# Patient Record
Sex: FEMALE | Race: WHITE | HISPANIC OR LATINO
[De-identification: names, ages, dates, MRNs, and addresses within clinical notes are randomized per-mention and may not be internally consistent; named-entity substitution may affect disease eponyms.]

---

## 2020-07-27 ENCOUNTER — APPOINTMENT (OUTPATIENT)
Dept: PULMONOLOGY | Facility: CLINIC | Age: 75
End: 2020-07-27

## 2024-01-22 ENCOUNTER — INPATIENT (INPATIENT)
Facility: HOSPITAL | Age: 79
LOS: 2 days | Discharge: HOME | DRG: 193 | End: 2024-01-25
Attending: STUDENT IN AN ORGANIZED HEALTH CARE EDUCATION/TRAINING PROGRAM | Admitting: HOSPITALIST
Payer: MEDICARE

## 2024-01-22 VITALS
RESPIRATION RATE: 22 BRPM | SYSTOLIC BLOOD PRESSURE: 161 MMHG | HEART RATE: 120 BPM | WEIGHT: 168.21 LBS | TEMPERATURE: 101 F | DIASTOLIC BLOOD PRESSURE: 84 MMHG | OXYGEN SATURATION: 96 %

## 2024-01-22 LAB
ALBUMIN SERPL ELPH-MCNC: 3.8 G/DL — SIGNIFICANT CHANGE UP (ref 3.3–5.2)
ALP SERPL-CCNC: 84 U/L — SIGNIFICANT CHANGE UP (ref 40–120)
ALT FLD-CCNC: 12 U/L — SIGNIFICANT CHANGE UP
ANION GAP SERPL CALC-SCNC: 12 MMOL/L — SIGNIFICANT CHANGE UP (ref 5–17)
APPEARANCE UR: CLEAR — SIGNIFICANT CHANGE UP
AST SERPL-CCNC: 22 U/L — SIGNIFICANT CHANGE UP
BACTERIA # UR AUTO: NEGATIVE /HPF — SIGNIFICANT CHANGE UP
BASOPHILS # BLD AUTO: 0.01 K/UL — SIGNIFICANT CHANGE UP (ref 0–0.2)
BASOPHILS NFR BLD AUTO: 0.2 % — SIGNIFICANT CHANGE UP (ref 0–2)
BILIRUB SERPL-MCNC: 0.4 MG/DL — SIGNIFICANT CHANGE UP (ref 0.4–2)
BILIRUB UR-MCNC: NEGATIVE — SIGNIFICANT CHANGE UP
BUN SERPL-MCNC: 10.7 MG/DL — SIGNIFICANT CHANGE UP (ref 8–20)
CALCIUM SERPL-MCNC: 9 MG/DL — SIGNIFICANT CHANGE UP (ref 8.4–10.5)
CAST: 1 /LPF — SIGNIFICANT CHANGE UP (ref 0–4)
CHLORIDE SERPL-SCNC: 96 MMOL/L — SIGNIFICANT CHANGE UP (ref 96–108)
CO2 SERPL-SCNC: 25 MMOL/L — SIGNIFICANT CHANGE UP (ref 22–29)
COLOR SPEC: SIGNIFICANT CHANGE UP
CREAT SERPL-MCNC: 0.71 MG/DL — SIGNIFICANT CHANGE UP (ref 0.5–1.3)
DIFF PNL FLD: NEGATIVE — SIGNIFICANT CHANGE UP
EGFR: 88 ML/MIN/1.73M2 — SIGNIFICANT CHANGE UP
EOSINOPHIL # BLD AUTO: 0 K/UL — SIGNIFICANT CHANGE UP (ref 0–0.5)
EOSINOPHIL NFR BLD AUTO: 0 % — SIGNIFICANT CHANGE UP (ref 0–6)
GLUCOSE SERPL-MCNC: 105 MG/DL — HIGH (ref 70–99)
GLUCOSE UR QL: NEGATIVE MG/DL — SIGNIFICANT CHANGE UP
HCT VFR BLD CALC: 43 % — SIGNIFICANT CHANGE UP (ref 34.5–45)
HGB BLD-MCNC: 14.3 G/DL — SIGNIFICANT CHANGE UP (ref 11.5–15.5)
HMPV RNA SPEC QL NAA+PROBE: DETECTED
IMM GRANULOCYTES NFR BLD AUTO: 0.2 % — SIGNIFICANT CHANGE UP (ref 0–0.9)
KETONES UR-MCNC: 15 MG/DL
LEUKOCYTE ESTERASE UR-ACNC: NEGATIVE — SIGNIFICANT CHANGE UP
LYMPHOCYTES # BLD AUTO: 1 K/UL — SIGNIFICANT CHANGE UP (ref 1–3.3)
LYMPHOCYTES # BLD AUTO: 20 % — SIGNIFICANT CHANGE UP (ref 13–44)
MCHC RBC-ENTMCNC: 27.8 PG — SIGNIFICANT CHANGE UP (ref 27–34)
MCHC RBC-ENTMCNC: 33.3 GM/DL — SIGNIFICANT CHANGE UP (ref 32–36)
MCV RBC AUTO: 83.7 FL — SIGNIFICANT CHANGE UP (ref 80–100)
MONOCYTES # BLD AUTO: 0.21 K/UL — SIGNIFICANT CHANGE UP (ref 0–0.9)
MONOCYTES NFR BLD AUTO: 4.2 % — SIGNIFICANT CHANGE UP (ref 2–14)
NEUTROPHILS # BLD AUTO: 3.78 K/UL — SIGNIFICANT CHANGE UP (ref 1.8–7.4)
NEUTROPHILS NFR BLD AUTO: 75.4 % — SIGNIFICANT CHANGE UP (ref 43–77)
NITRITE UR-MCNC: NEGATIVE — SIGNIFICANT CHANGE UP
PH UR: 6 — SIGNIFICANT CHANGE UP (ref 5–8)
PLATELET # BLD AUTO: 196 K/UL — SIGNIFICANT CHANGE UP (ref 150–400)
POTASSIUM SERPL-MCNC: 4.5 MMOL/L — SIGNIFICANT CHANGE UP (ref 3.5–5.3)
POTASSIUM SERPL-SCNC: 4.5 MMOL/L — SIGNIFICANT CHANGE UP (ref 3.5–5.3)
PROCALCITONIN SERPL-MCNC: 0.1 NG/ML — SIGNIFICANT CHANGE UP (ref 0.02–0.1)
PROT SERPL-MCNC: 6.9 G/DL — SIGNIFICANT CHANGE UP (ref 6.6–8.7)
PROT UR-MCNC: NEGATIVE MG/DL — SIGNIFICANT CHANGE UP
RAPID RVP RESULT: DETECTED
RBC # BLD: 5.14 M/UL — SIGNIFICANT CHANGE UP (ref 3.8–5.2)
RBC # FLD: 14.8 % — HIGH (ref 10.3–14.5)
RBC CASTS # UR COMP ASSIST: 6 /HPF — HIGH (ref 0–4)
SARS-COV-2 RNA SPEC QL NAA+PROBE: SIGNIFICANT CHANGE UP
SODIUM SERPL-SCNC: 133 MMOL/L — LOW (ref 135–145)
SP GR SPEC: 1.03 — SIGNIFICANT CHANGE UP (ref 1–1.03)
SQUAMOUS # UR AUTO: 2 /HPF — SIGNIFICANT CHANGE UP (ref 0–5)
TROPONIN T, HIGH SENSITIVITY RESULT: 11 NG/L — SIGNIFICANT CHANGE UP (ref 0–51)
UROBILINOGEN FLD QL: 1 MG/DL — SIGNIFICANT CHANGE UP (ref 0.2–1)
WBC # BLD: 5.01 K/UL — SIGNIFICANT CHANGE UP (ref 3.8–10.5)
WBC # FLD AUTO: 5.01 K/UL — SIGNIFICANT CHANGE UP (ref 3.8–10.5)
WBC UR QL: 1 /HPF — SIGNIFICANT CHANGE UP (ref 0–5)

## 2024-01-22 PROCEDURE — 71275 CT ANGIOGRAPHY CHEST: CPT | Mod: 26,MA

## 2024-01-22 PROCEDURE — 99285 EMERGENCY DEPT VISIT HI MDM: CPT

## 2024-01-22 PROCEDURE — 71045 X-RAY EXAM CHEST 1 VIEW: CPT | Mod: 26

## 2024-01-22 RX ORDER — IPRATROPIUM/ALBUTEROL SULFATE 18-103MCG
3 AEROSOL WITH ADAPTER (GRAM) INHALATION ONCE
Refills: 0 | Status: COMPLETED | OUTPATIENT
Start: 2024-01-22 | End: 2024-01-22

## 2024-01-22 RX ORDER — ACETAMINOPHEN 500 MG
975 TABLET ORAL ONCE
Refills: 0 | Status: COMPLETED | OUTPATIENT
Start: 2024-01-22 | End: 2024-01-22

## 2024-01-22 RX ORDER — ACETAMINOPHEN 500 MG
650 TABLET ORAL ONCE
Refills: 0 | Status: COMPLETED | OUTPATIENT
Start: 2024-01-22 | End: 2024-01-22

## 2024-01-22 RX ORDER — AZITHROMYCIN 500 MG/1
500 TABLET, FILM COATED ORAL ONCE
Refills: 0 | Status: COMPLETED | OUTPATIENT
Start: 2024-01-22 | End: 2024-01-22

## 2024-01-22 RX ORDER — SODIUM CHLORIDE 9 MG/ML
1750 INJECTION INTRAMUSCULAR; INTRAVENOUS; SUBCUTANEOUS ONCE
Refills: 0 | Status: COMPLETED | OUTPATIENT
Start: 2024-01-22 | End: 2024-01-22

## 2024-01-22 RX ORDER — CEFTRIAXONE 500 MG/1
1000 INJECTION, POWDER, FOR SOLUTION INTRAMUSCULAR; INTRAVENOUS ONCE
Refills: 0 | Status: COMPLETED | OUTPATIENT
Start: 2024-01-22 | End: 2024-01-22

## 2024-01-22 RX ORDER — CEFTRIAXONE 500 MG/1
1000 INJECTION, POWDER, FOR SOLUTION INTRAMUSCULAR; INTRAVENOUS ONCE
Refills: 0 | Status: DISCONTINUED | OUTPATIENT
Start: 2024-01-22 | End: 2024-01-22

## 2024-01-22 RX ADMIN — Medication 975 MILLIGRAM(S): at 22:00

## 2024-01-22 RX ADMIN — Medication 975 MILLIGRAM(S): at 21:46

## 2024-01-22 RX ADMIN — Medication 3 MILLILITER(S): at 18:43

## 2024-01-22 RX ADMIN — SODIUM CHLORIDE 875 MILLILITER(S): 9 INJECTION INTRAMUSCULAR; INTRAVENOUS; SUBCUTANEOUS at 16:50

## 2024-01-22 RX ADMIN — CEFTRIAXONE 1000 MILLIGRAM(S): 500 INJECTION, POWDER, FOR SOLUTION INTRAMUSCULAR; INTRAVENOUS at 16:50

## 2024-01-22 RX ADMIN — Medication 3 MILLILITER(S): at 19:30

## 2024-01-22 RX ADMIN — AZITHROMYCIN 255 MILLIGRAM(S): 500 TABLET, FILM COATED ORAL at 16:50

## 2024-01-22 RX ADMIN — Medication 650 MILLIGRAM(S): at 16:08

## 2024-01-22 NOTE — ED ADULT NURSE REASSESSMENT NOTE - NS ED NURSE REASSESS COMMENT FT1
Patient remains resting in bed. Pt denies new CO pain or discomfort @ this time. Pt remains on monitor. IV site assessed - dry intact transparent, flushing well, good blood return, no abnormalities noted. Pt awaiting dispo.

## 2024-01-22 NOTE — ED PROVIDER NOTE - DATE/TIME 1
Render Note In Bullet Format When Appropriate: No Duration Of Freeze Thaw-Cycle (Seconds): 10 Consent: The patient's consent was obtained including but not limited to risks of crusting, scabbing, blistering, scarring, darker or lighter pigmentary change, recurrence, incomplete removal and infection. Detail Level: Detailed Number Of Freeze-Thaw Cycles: 1 freeze-thaw cycle Show Applicator Variable?: Yes Post-Care Instructions: I reviewed with the patient in detail post-care instructions. Patient is to wear sunprotection, and avoid picking at any of the treated lesions. Pt may apply Vaseline to crusted or scabbing areas. 22-Jan-2024 19:05

## 2024-01-22 NOTE — ED PROVIDER NOTE - CLINICAL SUMMARY MEDICAL DECISION MAKING FREE TEXT BOX
79 yo F with pmh htn, hld, remote TIA presents with 103 deg fever, sob, cough. Pt meets sirs criteria, with respiratory rate, temp, heart rate. suspect pneumonia.    - iv azithromycin and ceftriaxone  - lactate and white count normal  - iv fluids 1.75 L bolus  - pending rvp and ua  - no focal consolidation on cxr  - s/p tylenol for fever 77 yo F with pmh htn, hld, remote TIA presents with 103 deg fever, sob, cough. Pt meets sirs criteria, with respiratory rate, temp, heart rate. suspect pneumonia.    - iv azithromycin and ceftriaxone  - lactate and white count normal  - iv fluids 1.75 L bolus  - pending rvp and ua  - no focal consolidation on cxr  - s/p tylenol for fever    Estinfa: Patient re-assessed after CT scan. She is resting comfortably but pulse ox noted to be 90-92 %

## 2024-01-22 NOTE — ED ADULT TRIAGE NOTE - CHIEF COMPLAINT QUOTE
high fever and cough, sent in from urgent care. high fever and cough, sent in from urgent care. hard of hearing.

## 2024-01-22 NOTE — ED ADULT NURSE REASSESSMENT NOTE - NS ED NURSE REASSESS COMMENT FT1
Patient received from RN @ shift change. Pt remains resting in bed. Son @ BS. Pt remains on monitor. IV site assessed - dry intact transparent, flushing well, good blood return, no abnormalities noted. Pt [place]. Visualized ambulation 1 assist to bathroom with son. A&Ox4, HEENT clear, pt receiving neb tx, pt states she does not wear O2 @ home, denies CP, abd SNT. Pt awaiting CTA.

## 2024-01-22 NOTE — ED ADULT NURSE NOTE - NSFALLUNIVINTERV_ED_ALL_ED
Bed/Stretcher in lowest position, wheels locked, appropriate side rails in place/Call bell, personal items and telephone in reach/Instruct patient to call for assistance before getting out of bed/chair/stretcher/Non-slip footwear applied when patient is off stretcher/[place] to call system/Physically safe environment - no spills, clutter or unnecessary equipment/Purposeful proactive rounding/Room/bathroom lighting operational, light cord in reach

## 2024-01-22 NOTE — ED PROVIDER NOTE - NS ED ROS FT
Const: + fever, chills  HEENT: Denies blurry vision, sore throat  Neck: Denies neck pain/stiffness  Resp: +coughing, SOB  Cardiovascular: + CP, Denies palpitations, LE edema  GI: + abdominal painDenies nausea, vomiting, diarrhea, constipation  : Denies urinary frequenc, dysuria  Neuro: Denies HA, dizziness, numbness, weakness  Skin: Denies rashes.

## 2024-01-22 NOTE — ED PROVIDER NOTE - ATTENDING CONTRIBUTION TO CARE
78 yoF; with PMH significant for HTN, HLD, TIA A-fib (on Plavix); now presenting with fever, chills, cough, generalized malaise.  Patient reports increasing cough and shortness of breath over the past 5 days.  Complaining of increasing productive cough with yellow sputum.  Complaining of mild chest tightness.  Complaining of generalized malaise with bodyaches over the past 2 days.  Denies any sick contacts or travel.  Denies dysuria, frequency, urgency.  Denies any diarrhea.  Denies any abdominal pain.  Denies nausea or vomiting.  Denies neck pain or headache.  General:     NAD  Head:     NC/AT, EOMI, oral mucosa moist  Neck:     trachea midline  Lungs:     CTA b/l, no w/r/r  CVS:     S1S2, RRR, no m/g/r  Abd:     +BS, s/nt/nd, no organomegaly  Ext:    2+ radial and pedal pulses, no c/c/e  Neuro: AAOx3, no sensory/motor deficits  A/P:  78yoF p/w cough, fever, bodyaches.  patient febrile, tachycardic, and borderline hypoxic (93%). will do labs, xray, nebs, abx, ivf, cta r/o pe.

## 2024-01-22 NOTE — ED PROVIDER NOTE - PHYSICAL EXAMINATION
Const: Awake, alert and oriented. In no acute distress but appear fatigue and uncomfortable  HEENT: NC/AT. dry mucous membranes.  Eyes: No scleral icterus. EOMI.  Neck:. Soft and supple. Full ROM without pain.  Cardiac: Regular rate and regular rhythm. +S1/S2. No murmurs. Peripheral pulses 2+ and symmetric. No LE edema.  Resp: Speaking in full sentences. No evidence of respiratory distress. No wheezes, rales or rhonchi.  Abd: Soft, non-tender, non-distended. Normal bowel sounds in all 4 quadrants. No guarding or rebound.  Back: Spine midline and non-tender. No CVAT.  Skin: No rashes, abrasions or lacerations.  Lymph: No cervical lymphadenopathy.  Neuro: Awake, alert & oriented x 3. Moves all extremities symmetrically.

## 2024-01-22 NOTE — ED PROVIDER NOTE - PROGRESS NOTE DETAILS
Dyspnea symptomatically improved on nebulizer. Current temp 99.9 deg F post tylenol po. Continues to be tachycardic to 110s, spo2 94% post fluid bolus and antibiotics. lung exam stable  - pending CTA chest, rule out pe  - human metapneumonvirus positive on rvp

## 2024-01-22 NOTE — ED ADULT TRIAGE NOTE - BP NONINVASIVE DIASTOLIC (MM HG)
84 [Alert] : alert [No Acute Distress] : no acute distress [Normocephalic] : normocephalic [EOMI Bilateral] : EOMI bilateral [Clear tympanic membranes with bony landmarks and light reflex present bilaterally] : clear tympanic membranes with bony landmarks and light reflex present bilaterally  [Pink Nasal Mucosa] : pink nasal mucosa [Nonerythematous Oropharynx] : nonerythematous oropharynx [Supple, full passive range of motion] : supple, full passive range of motion [No Palpable Masses] : no palpable masses [Clear to Auscultation Bilaterally] : clear to auscultation bilaterally [Regular Rate and Rhythm] : regular rate and rhythm [Normal S1, S2 audible] : normal S1, S2 audible [No Murmurs] : no murmurs [+2 Femoral Pulses] : +2 femoral pulses [Soft] : soft [NonTender] : non tender [Non Distended] : non distended [Normoactive Bowel Sounds] : normoactive bowel sounds [No Hepatomegaly] : no hepatomegaly [No Splenomegaly] : no splenomegaly [Mark: _____] : Mark [unfilled] [No Abnormal Lymph Nodes Palpated] : no abnormal lymph nodes palpated [Normal Muscle Tone] : normal muscle tone [No Gait Asymmetry] : no gait asymmetry [No pain or deformities with palpation of bone, muscles, joints] : no pain or deformities with palpation of bone, muscles, joints [Straight] : straight [+2 Patella DTR] : +2 patella DTR [Cranial Nerves Grossly Intact] : cranial nerves grossly intact [No Rash or Lesions] : no rash or lesions

## 2024-01-23 DIAGNOSIS — J12.3 HUMAN METAPNEUMOVIRUS PNEUMONIA: ICD-10-CM

## 2024-01-23 LAB
ALBUMIN SERPL ELPH-MCNC: 3.6 G/DL — SIGNIFICANT CHANGE UP (ref 3.3–5.2)
ALP SERPL-CCNC: 74 U/L — SIGNIFICANT CHANGE UP (ref 40–120)
ALT FLD-CCNC: 11 U/L — SIGNIFICANT CHANGE UP
ANION GAP SERPL CALC-SCNC: 9 MMOL/L — SIGNIFICANT CHANGE UP (ref 5–17)
AST SERPL-CCNC: 18 U/L — SIGNIFICANT CHANGE UP
BASOPHILS # BLD AUTO: 0.01 K/UL — SIGNIFICANT CHANGE UP (ref 0–0.2)
BASOPHILS NFR BLD AUTO: 0.2 % — SIGNIFICANT CHANGE UP (ref 0–2)
BILIRUB SERPL-MCNC: 0.3 MG/DL — LOW (ref 0.4–2)
BUN SERPL-MCNC: 8.2 MG/DL — SIGNIFICANT CHANGE UP (ref 8–20)
CALCIUM SERPL-MCNC: 8.3 MG/DL — LOW (ref 8.4–10.5)
CHLORIDE SERPL-SCNC: 103 MMOL/L — SIGNIFICANT CHANGE UP (ref 96–108)
CO2 SERPL-SCNC: 26 MMOL/L — SIGNIFICANT CHANGE UP (ref 22–29)
CREAT SERPL-MCNC: 0.68 MG/DL — SIGNIFICANT CHANGE UP (ref 0.5–1.3)
CULTURE RESULTS: SIGNIFICANT CHANGE UP
EGFR: 90 ML/MIN/1.73M2 — SIGNIFICANT CHANGE UP
EOSINOPHIL # BLD AUTO: 0.02 K/UL — SIGNIFICANT CHANGE UP (ref 0–0.5)
EOSINOPHIL NFR BLD AUTO: 0.4 % — SIGNIFICANT CHANGE UP (ref 0–6)
GLUCOSE BLDC GLUCOMTR-MCNC: 117 MG/DL — HIGH (ref 70–99)
GLUCOSE BLDC GLUCOMTR-MCNC: 125 MG/DL — HIGH (ref 70–99)
GLUCOSE SERPL-MCNC: 106 MG/DL — HIGH (ref 70–99)
HCT VFR BLD CALC: 41.2 % — SIGNIFICANT CHANGE UP (ref 34.5–45)
HGB BLD-MCNC: 13.6 G/DL — SIGNIFICANT CHANGE UP (ref 11.5–15.5)
IMM GRANULOCYTES NFR BLD AUTO: 0.4 % — SIGNIFICANT CHANGE UP (ref 0–0.9)
LYMPHOCYTES # BLD AUTO: 1.16 K/UL — SIGNIFICANT CHANGE UP (ref 1–3.3)
LYMPHOCYTES # BLD AUTO: 22.5 % — SIGNIFICANT CHANGE UP (ref 13–44)
MCHC RBC-ENTMCNC: 28 PG — SIGNIFICANT CHANGE UP (ref 27–34)
MCHC RBC-ENTMCNC: 33 GM/DL — SIGNIFICANT CHANGE UP (ref 32–36)
MCV RBC AUTO: 84.9 FL — SIGNIFICANT CHANGE UP (ref 80–100)
MONOCYTES # BLD AUTO: 0.33 K/UL — SIGNIFICANT CHANGE UP (ref 0–0.9)
MONOCYTES NFR BLD AUTO: 6.4 % — SIGNIFICANT CHANGE UP (ref 2–14)
MRSA PCR RESULT.: SIGNIFICANT CHANGE UP
NEUTROPHILS # BLD AUTO: 3.61 K/UL — SIGNIFICANT CHANGE UP (ref 1.8–7.4)
NEUTROPHILS NFR BLD AUTO: 70.1 % — SIGNIFICANT CHANGE UP (ref 43–77)
PLATELET # BLD AUTO: 169 K/UL — SIGNIFICANT CHANGE UP (ref 150–400)
POTASSIUM SERPL-MCNC: 4.2 MMOL/L — SIGNIFICANT CHANGE UP (ref 3.5–5.3)
POTASSIUM SERPL-SCNC: 4.2 MMOL/L — SIGNIFICANT CHANGE UP (ref 3.5–5.3)
PROT SERPL-MCNC: 6.2 G/DL — LOW (ref 6.6–8.7)
RBC # BLD: 4.85 M/UL — SIGNIFICANT CHANGE UP (ref 3.8–5.2)
RBC # FLD: 15 % — HIGH (ref 10.3–14.5)
S AUREUS DNA NOSE QL NAA+PROBE: SIGNIFICANT CHANGE UP
SODIUM SERPL-SCNC: 138 MMOL/L — SIGNIFICANT CHANGE UP (ref 135–145)
SPECIMEN SOURCE: SIGNIFICANT CHANGE UP
WBC # BLD: 5.15 K/UL — SIGNIFICANT CHANGE UP (ref 3.8–10.5)
WBC # FLD AUTO: 5.15 K/UL — SIGNIFICANT CHANGE UP (ref 3.8–10.5)

## 2024-01-23 PROCEDURE — 99223 1ST HOSP IP/OBS HIGH 75: CPT

## 2024-01-23 PROCEDURE — 93306 TTE W/DOPPLER COMPLETE: CPT | Mod: 26

## 2024-01-23 RX ORDER — SODIUM CHLORIDE 9 MG/ML
1000 INJECTION, SOLUTION INTRAVENOUS
Refills: 0 | Status: DISCONTINUED | OUTPATIENT
Start: 2024-01-23 | End: 2024-01-25

## 2024-01-23 RX ORDER — INSULIN LISPRO 100/ML
VIAL (ML) SUBCUTANEOUS
Refills: 0 | Status: DISCONTINUED | OUTPATIENT
Start: 2024-01-23 | End: 2024-01-25

## 2024-01-23 RX ORDER — CEFTRIAXONE 500 MG/1
1000 INJECTION, POWDER, FOR SOLUTION INTRAMUSCULAR; INTRAVENOUS EVERY 24 HOURS
Refills: 0 | Status: DISCONTINUED | OUTPATIENT
Start: 2024-01-23 | End: 2024-01-25

## 2024-01-23 RX ORDER — ASPIRIN/CALCIUM CARB/MAGNESIUM 324 MG
1 TABLET ORAL
Refills: 0 | DISCHARGE

## 2024-01-23 RX ORDER — LANOLIN ALCOHOL/MO/W.PET/CERES
3 CREAM (GRAM) TOPICAL AT BEDTIME
Refills: 0 | Status: DISCONTINUED | OUTPATIENT
Start: 2024-01-23 | End: 2024-01-25

## 2024-01-23 RX ORDER — DEXTROSE 50 % IN WATER 50 %
25 SYRINGE (ML) INTRAVENOUS ONCE
Refills: 0 | Status: DISCONTINUED | OUTPATIENT
Start: 2024-01-23 | End: 2024-01-25

## 2024-01-23 RX ORDER — ENOXAPARIN SODIUM 100 MG/ML
40 INJECTION SUBCUTANEOUS EVERY 24 HOURS
Refills: 0 | Status: DISCONTINUED | OUTPATIENT
Start: 2024-01-23 | End: 2024-01-25

## 2024-01-23 RX ORDER — ACETAMINOPHEN 500 MG
650 TABLET ORAL EVERY 6 HOURS
Refills: 0 | Status: DISCONTINUED | OUTPATIENT
Start: 2024-01-23 | End: 2024-01-25

## 2024-01-23 RX ORDER — GLUCAGON INJECTION, SOLUTION 0.5 MG/.1ML
1 INJECTION, SOLUTION SUBCUTANEOUS ONCE
Refills: 0 | Status: DISCONTINUED | OUTPATIENT
Start: 2024-01-23 | End: 2024-01-25

## 2024-01-23 RX ORDER — DEXTROSE 50 % IN WATER 50 %
12.5 SYRINGE (ML) INTRAVENOUS ONCE
Refills: 0 | Status: DISCONTINUED | OUTPATIENT
Start: 2024-01-23 | End: 2024-01-25

## 2024-01-23 RX ORDER — IPRATROPIUM/ALBUTEROL SULFATE 18-103MCG
3 AEROSOL WITH ADAPTER (GRAM) INHALATION EVERY 6 HOURS
Refills: 0 | Status: DISCONTINUED | OUTPATIENT
Start: 2024-01-23 | End: 2024-01-25

## 2024-01-23 RX ORDER — DEXTROSE 50 % IN WATER 50 %
15 SYRINGE (ML) INTRAVENOUS ONCE
Refills: 0 | Status: DISCONTINUED | OUTPATIENT
Start: 2024-01-23 | End: 2024-01-25

## 2024-01-23 RX ORDER — ONDANSETRON 8 MG/1
4 TABLET, FILM COATED ORAL EVERY 8 HOURS
Refills: 0 | Status: DISCONTINUED | OUTPATIENT
Start: 2024-01-23 | End: 2024-01-25

## 2024-01-23 RX ORDER — INSULIN LISPRO 100/ML
VIAL (ML) SUBCUTANEOUS AT BEDTIME
Refills: 0 | Status: DISCONTINUED | OUTPATIENT
Start: 2024-01-23 | End: 2024-01-25

## 2024-01-23 RX ORDER — CLOPIDOGREL BISULFATE 75 MG/1
1 TABLET, FILM COATED ORAL
Refills: 0 | DISCHARGE

## 2024-01-23 RX ORDER — AZITHROMYCIN 500 MG/1
500 TABLET, FILM COATED ORAL EVERY 24 HOURS
Refills: 0 | Status: DISCONTINUED | OUTPATIENT
Start: 2024-01-23 | End: 2024-01-25

## 2024-01-23 RX ORDER — ROSUVASTATIN CALCIUM 5 MG/1
1 TABLET ORAL
Refills: 0 | DISCHARGE

## 2024-01-23 RX ORDER — VERAPAMIL HCL 240 MG
1 CAPSULE, EXTENDED RELEASE PELLETS 24 HR ORAL
Refills: 0 | DISCHARGE

## 2024-01-23 RX ADMIN — CEFTRIAXONE 1000 MILLIGRAM(S): 500 INJECTION, POWDER, FOR SOLUTION INTRAMUSCULAR; INTRAVENOUS at 17:08

## 2024-01-23 RX ADMIN — Medication 650 MILLIGRAM(S): at 21:55

## 2024-01-23 RX ADMIN — Medication 40 MILLIGRAM(S): at 21:55

## 2024-01-23 RX ADMIN — Medication 3 MILLILITER(S): at 21:26

## 2024-01-23 RX ADMIN — Medication 3 MILLILITER(S): at 14:47

## 2024-01-23 RX ADMIN — Medication 650 MILLIGRAM(S): at 22:54

## 2024-01-23 RX ADMIN — Medication 650 MILLIGRAM(S): at 08:43

## 2024-01-23 RX ADMIN — Medication 650 MILLIGRAM(S): at 09:40

## 2024-01-23 RX ADMIN — AZITHROMYCIN 255 MILLIGRAM(S): 500 TABLET, FILM COATED ORAL at 17:08

## 2024-01-23 RX ADMIN — Medication 3 MILLILITER(S): at 10:20

## 2024-01-23 NOTE — H&P ADULT - HISTORY OF PRESENT ILLNESS
78M with PMHX TIA, HTN, HLD presents to [facility] ER c/o Cough, SOB, and Fever Tmax 103F which began few days ago. Patient noted to have RLL PNA on CT imaging. RVP +HMPV. Given empiric IV ABX with Ceftriaxone/Azithromycin. Pt seen/examined. Poor historian. Answering questions appropriately but states she doesnt know her past medical problems, surgeries, or home medications. Asking if I can call her  in AM for more information but not now because he is sleeping. Denies any allergies.     ROS negative unless mentioned.    PMHX: TIA, HTN, HLD  PSHX: Unable to confirm  FamHx: Unable to confirm  Social Hx: Denies etoh/tobacco/drug abuse

## 2024-01-23 NOTE — H&P ADULT - ASSESSMENT
ASSESSMENT:  78M with PMHX TIA, HTN, HLD presents to [facility] ER c/o Cough, SOB, and Fever Tmax 103F which began few days ago admitted for Acute Hypoxic Respiratory Failure 2/2 RLL PNA and HMPV.    PLAN:  Acute Hypoxic Respiratory Failure 2/2 RLL PNA and HMPV  -Admit to   -O2 via NC PRN SPO2 >92%  -Duoneb q6  -BCX x2 Pending  -Check Sputum CX/Atypicals/MRSA  -WBC no leukocytosis  -Procal Negative  -CT Chest IVC +RLL PNA  -Empiric IV ABX with Ceftriaxone/Azithromycin  -RVP +HMPV  -Isolation Precautions    TIA, HTN, HLD  -Confirm ASA 81mg and Plavix 75mg q24 in AM  -Confirm Verapamil ER 100mg q24 in AM  -COnfirm Crestor 20mg in AM    Medication Reconciliation  -Patient does not know her pmhx/pshx/medications   -Pharmacy records reviewed and placed in med rec  -Patient asking to confirm meds with  in AM because he is sleeping

## 2024-01-23 NOTE — ED ADULT NURSE REASSESSMENT NOTE - NS ED NURSE REASSESS COMMENT FT1
Pt remains resting in bed. Pt remains on monitor. IV sites assessed - dry intact transparent, flushing well, good blood return, no abnormalities noted. No additional CO pain or discomfort @ this time. Safety maintained.

## 2024-01-23 NOTE — CONSULT NOTE ADULT - REASON FOR ADMISSION
Patient Instructions      1. Continue to monitor carbohydrate and protein intake- remember to keep your           total  carbohydrates to 50 grams or less per day for best results. 2. Remember hydration goals - usually 48 to 64 ounces of liquids per day  3. Continue to work towards exercise goals - minimum 3 days per week of 45          minutes to  1 hour at a time. 4. Remember to take vitamins as directed        Supplement Resource Guide    Importance of Protein:   Maintains lean body mass, produces antibodies to fight off infections, heals wounds, minimizes hair loss, helps to give you energy, helps with satiety, and keeping you full between meals. Importance of Calcium:  Needed for healthy bones and teeth, normal blood clotting, and nervous system functioning, higher risk of osteoporosis and bone disease with non-compliance. Importance of Multivitamins: Many functions. Supply you with extra nutrients that you may be missing from food. May lead to iron deficiency anemia, weakness, fatigue, and many other symptoms with non-compliance. Importance of B Vitamins:  Important for red blood cell formation, metabolism, energy, and helps to maintain a healthy nervous system. Protein Supplement  Find one you like now. Use immediately after surgery. Look for:  35-50g protein each day from your protein supplement once you reach the progression diet. 0-3 g fat per serving  0-3 g sugar per serving    Protein drinks should be split in separate dosages. Recommend: Lifelong  1 year + Calcium Supplement:     Start taking within a month after surgery. Look for: Calcium Citrate Plus D (1500 mg per day)  Recommend: Citracal     .          Avoid chocolate chewable calcium. Can use chewable bariatric or [employer] brand or similar chewable. The body cannot absorb more than 500-600 mg @ a time.       Take for Life Multi-vitamin Supplement:      1st Month SOB/Cough

## 2024-01-23 NOTE — CONSULT NOTE ADULT - SUBJECTIVE AND OBJECTIVE BOX
PULMONARY CONSULT NOTE      ALEJANDRO SANTILLAN-[id number]    Patient is a 78y old  Female who presents with a chief complaint of SOB/Cough (23 Jan 2024 14:32)      HISTORY OF PRESENT ILLNESS:  presenting with 2-4 days of fever and shortness of breath along with cough. was eval by PCP who rec hospital evaluation. Hypoxic on presentation. found to have viral infec. no resp illness history. never smoker.     MEDICATIONS  (STANDING):  albuterol/ipratropium for Nebulization 3 milliLiter(s) Nebulizer every 6 hours  azithromycin  IVPB 500 milliGRAM(s) IV Intermittent every 24 hours  cefTRIAXone Injectable. 1000 milliGRAM(s) IV Push every 24 hours  dextrose 5%. 1000 milliLiter(s) (50 mL/Hr) IV Continuous <Continuous>  dextrose 5%. 1000 milliLiter(s) (100 mL/Hr) IV Continuous <Continuous>  dextrose 50% Injectable 25 Gram(s) IV Push once  dextrose 50% Injectable 12.5 Gram(s) IV Push once  dextrose 50% Injectable 25 Gram(s) IV Push once  enoxaparin Injectable 40 milliGRAM(s) SubCutaneous every 24 hours  glucagon  Injectable 1 milliGRAM(s) IntraMuscular once  insulin lispro (ADMELOG) corrective regimen sliding scale   SubCutaneous at bedtime  insulin lispro (ADMELOG) corrective regimen sliding scale   SubCutaneous three times a day before meals  methylPREDNISolone sodium succinate Injectable 40 milliGRAM(s) IV Push every 8 hours      MEDICATIONS  (PRN):  acetaminophen     Tablet .. 650 milliGRAM(s) Oral every 6 hours PRN Temp greater or equal to 38C (100.4F), Mild Pain (1 - 3)  aluminum hydroxide/magnesium hydroxide/simethicone Suspension 30 milliLiter(s) Oral every 4 hours PRN Dyspepsia  dextrose Oral Gel 15 Gram(s) Oral once PRN Blood Glucose LESS THAN 70 milliGRAM(s)/deciliter  hydrocodone/homatropine Syrup 5 milliLiter(s) Oral every 12 hours PRN Cough  melatonin 3 milliGRAM(s) Oral at bedtime PRN Insomnia  ondansetron Injectable 4 milliGRAM(s) IV Push every 8 hours PRN Nausea and/or Vomiting      Allergies    No Known Allergies    Intolerances        PAST MEDICAL & SURGICAL HISTORY:  Hypertension      Hyperlipidemia      TIA (transient ischemic attack)          FAMILY HISTORY:      SOCIAL HISTORY  Smoking History:     REVIEW OF SYSTEMS:    CONSTITUTIONAL:  No fevers, chills, sweats    HEENT:  Eyes:  No diplopia or blurred vision. ENT:  No earache, sore throat or runny nose.    CARDIOVASCULAR:  No pressure, squeezing, tightness, or heaviness about the chest; no palpitations.    RESPIRATORY: per HPI      GASTROINTESTINAL:  No abdominal pain, nausea, vomiting or diarrhea.    GENITOURINARY:  No dysuria, frequency or urgency.    NEUROLOGIC:  No paresthesias, fasciculations, seizures or weakness.    PSYCHIATRIC:  No disorder of thought or mood.    Vital Signs Last 24 Hrs  T(C): 37.1 (23 Jan 2024 15:55), Max: 38.9 (22 Jan 2024 21:42)  T(F): 98.7 (23 Jan 2024 15:55), Max: 102 (22 Jan 2024 21:42)  HR: 102 (23 Jan 2024 15:55) (79 - 115)  BP: 107/69 (23 Jan 2024 15:55) (104/60 - 175/74)  BP(mean): 104 (23 Jan 2024 08:40) (86 - 104)  RR: 18 (23 Jan 2024 15:55) (18 - 20)  SpO2: 95% (23 Jan 2024 15:55) (93% - 96%)    Parameters below as of 23 Jan 2024 15:55  Patient On (Oxygen Delivery Method): room air        PHYSICAL EXAMINATION:    GENERAL: The patient is a well-developed, well-nourished in no apparent distress.     HEENT: Head is normocephalic and atraumatic. Extraocular muscles are intact. Mucous membranes are moist.     NECK: Supple.     LUNGS: wheezing in all lung fields    HEART: Regular rate and rhythm without murmur.    ABDOMEN: Soft, nontender, and nondistended.  No hepatosplenomegaly is noted.    EXTREMITIES: Without any cyanosis, clubbing, rash, lesions or edema.    NEUROLOGIC: Grossly intact.      LABS:                        13.6   5.15  )-----------( 169      ( 23 Jan 2024 06:30 )             41.2     01-23    138  |  103  |  8.2  ----------------------------<  106<H>  4.2   |  26.0  |  0.68    Ca    8.3<L>      23 Jan 2024 06:30    TPro  6.2<L>  /  Alb  3.6  /  TBili  0.3<L>  /  DBili  x   /  AST  18  /  ALT  11  /  AlkPhos  74  01-23      Urinalysis Basic - ( 23 Jan 2024 06:30 )    Color: x / Appearance: x / SG: x / pH: x  Gluc: 106 mg/dL / Ketone: x  / Bili: x / Urobili: x   Blood: x / Protein: x / Nitrite: x   Leuk Esterase: x / RBC: x / WBC x   Sq Epi: x / Non Sq Epi: x / Bacteria: x                  Procalcitonin, Serum: 0.10 ng/mL (01-22-24 @ 16:30)      MICROBIOLOGY:    RADIOLOGY & ADDITIONAL STUDIES:

## 2024-01-23 NOTE — PROGRESS NOTE ADULT - SUBJECTIVE AND OBJECTIVE BOX
Patient is a 78y old  Female who presents with a chief complaint of SOB/Cough (23 Jan 2024 03:55)    INTERVAL HPI/OVERNIGHT EVENTS: No acute events overnight. HD stable.     MEDICATIONS  (STANDING):  albuterol/ipratropium for Nebulization 3 milliLiter(s) Nebulizer every 6 hours  azithromycin  IVPB 500 milliGRAM(s) IV Intermittent every 24 hours  cefTRIAXone Injectable. 1000 milliGRAM(s) IV Push every 24 hours  dextrose 5%. 1000 milliLiter(s) (50 mL/Hr) IV Continuous <Continuous>  dextrose 5%. 1000 milliLiter(s) (100 mL/Hr) IV Continuous <Continuous>  dextrose 50% Injectable 25 Gram(s) IV Push once  dextrose 50% Injectable 12.5 Gram(s) IV Push once  dextrose 50% Injectable 25 Gram(s) IV Push once  enoxaparin Injectable 40 milliGRAM(s) SubCutaneous every 24 hours  glucagon  Injectable 1 milliGRAM(s) IntraMuscular once  hydrocodone/homatropine Syrup 5 milliLiter(s) Oral every 12 hours  insulin lispro (ADMELOG) corrective regimen sliding scale   SubCutaneous three times a day before meals  insulin lispro (ADMELOG) corrective regimen sliding scale   SubCutaneous at bedtime  methylPREDNISolone sodium succinate Injectable 40 milliGRAM(s) IV Push every 8 hours    MEDICATIONS  (PRN):  acetaminophen     Tablet .. 650 milliGRAM(s) Oral every 6 hours PRN Temp greater or equal to 38C (100.4F), Mild Pain (1 - 3)  aluminum hydroxide/magnesium hydroxide/simethicone Suspension 30 milliLiter(s) Oral every 4 hours PRN Dyspepsia  dextrose Oral Gel 15 Gram(s) Oral once PRN Blood Glucose LESS THAN 70 milliGRAM(s)/deciliter  melatonin 3 milliGRAM(s) Oral at bedtime PRN Insomnia  ondansetron Injectable 4 milliGRAM(s) IV Push every 8 hours PRN Nausea and/or Vomiting      Allergies    No Known Allergies    Intolerances        REVIEW OF SYSTEMS: all negative with exception of above    Vital Signs Last 24 Hrs  T(C): 37.6 (23 Jan 2024 11:22), Max: 39.5 (22 Jan 2024 15:41)  T(F): 99.6 (23 Jan 2024 11:22), Max: 103.1 (22 Jan 2024 15:41)  HR: 101 (23 Jan 2024 11:22) (92 - 115)  BP: 133/63 (23 Jan 2024 11:22) (104/60 - 175/74)  BP(mean): 104 (23 Jan 2024 08:40) (86 - 104)  RR: 18 (23 Jan 2024 11:22) (18 - 24)  SpO2: 94% (23 Jan 2024 11:22) (93% - 96%)    Parameters below as of 23 Jan 2024 11:22  Patient On (Oxygen Delivery Method): room air    PHYSICAL EXAM:  GENERAL: NAD, well-groomed  NERVOUS SYSTEM:  Alert & Oriented X3, Good concentration; Motor Strength 5/5 B/L upper and lower extremities; DTRs 2+ intact and symmetric  CHEST/LUNG: diffuse wheezing, no rales  HEART: Regular rate and rhythm; No murmurs, rubs, or gallops  ABDOMEN: Soft, Nontender, Nondistended; Bowel sounds present  EXTREMITIES:  2+ Peripheral Pulses, No clubbing, cyanosis, or edema    LABS:                        13.6   5.15  )-----------( 169      ( 23 Jan 2024 06:30 )             41.2     01-23    138  |  103  |  8.2  ----------------------------<  106<H>  4.2   |  26.0  |  0.68    Ca    8.3<L>      23 Jan 2024 06:30    TPro  6.2<L>  /  Alb  3.6  /  TBili  0.3<L>  /  DBili  x   /  AST  18  /  ALT  11  /  AlkPhos  74  01-23      Urinalysis Basic - ( 23 Jan 2024 06:30 )    Color: x / Appearance: x / SG: x / pH: x  Gluc: 106 mg/dL / Ketone: x  / Bili: x / Urobili: x   Blood: x / Protein: x / Nitrite: x   Leuk Esterase: x / RBC: x / WBC x   Sq Epi: x / Non Sq Epi: x / Bacteria: x      CAPILLARY BLOOD GLUCOSE          RADIOLOGY & ADDITIONAL TESTS:    Imaging Personally Reviewed:  [ ] YES  [ ] NO    Consultant(s) Notes Reviewed:  [ ] YES  [ ] NO    Care Discussed with Consultants/Other Providers [ ] YES  [ ] NO

## 2024-01-23 NOTE — H&P ADULT - NSHPPHYSICALEXAM_GEN_ALL_CORE
Vital Signs Last 24 Hrs  T(C): 37.4 (23 Jan 2024 00:24), Max: 39.5 (22 Jan 2024 15:41)  T(F): 99.4 (23 Jan 2024 00:24), Max: 103.1 (22 Jan 2024 15:41)  HR: 92 (23 Jan 2024 00:24) (92 - 120)  BP: 104/60 (23 Jan 2024 00:24) (104/60 - 161/84)  BP(mean): 86 (22 Jan 2024 19:01) (86 - 99)  RR: 19 (23 Jan 2024 00:24) (19 - 24)  SpO2: 93% (23 Jan 2024 00:50) (93% - 96%)    Parameters below as of 23 Jan 2024 00:50  Patient On (Oxygen Delivery Method): room air    Constitutional: Well-appearing, NAD, VSS  Head: NC/AT  Eyes: PERRL, EOMI, anicteric sclera, conjunctiva WNL  ENT: Normal Pharynx, MMM, No tonsillar exudate/erythema +[place]  Neck: Supple, Non-tender  Chest: Non-tender, no rashes  Cardio: RRR, s1/s2, no appreciable murmurs/rubs/gallops  Resp: BS CTA bilaterally, no wheezing/rhonchi/rales  Abd: Soft, Non-tender, Non-distended, no rebound/guarding/rigidity  : not examined  Rectal: not examined  MSK: moving all extremities, no motor weakness, full ROM x4  Ext: palpable distal pulses, good capillary refill, no clubbing/cyanosis/edema  Psych: appropriate, cooperative  Neuro: CN II-XII grossly intact, no focal deficits  Skin: Warm/Dry. No rashes.

## 2024-01-23 NOTE — PROGRESS NOTE ADULT - ASSESSMENT
ASSESSMENT:  78M with PMHX TIA, HTN, HLD presents to [facility] ER c/o Cough, SOB, and Fever Tmax 103F which began few days ago admitted for Acute Hypoxic Respiratory Failure 2/2 RLL PNA and HMPV.    PLAN:  Acute Hypoxic Respiratory Failure 2/2 RLL PNA and HMPV  -Admit to   -O2 via NC PRN SPO2 >92%  -Duoneb q6  -BCX x2 Pending  -Check Sputum CX/Atypicals/MRSA  -WBC no leukocytosis  -Procal Negative  -CT Chest IVC +RLL PNA  -Empiric IV ABX with Ceftriaxone/Azithromycin  -Started solumedrol  - Pulm c/s placed  - TTE ordered  -RVP +HMPV  -Isolation Precautions    TIA, HTN, HLD  -C/w ASA 81mg,  Plavix 75mg q24, Verapamil ER 100mg q24  -C/w Crestor 20mg

## 2024-01-23 NOTE — CONSULT NOTE ADULT - ASSESSMENT
78yr old with viral bronchitis, HMNV and concern for bacterial PNA.   - cont nebs scheduled   - cefriaxone + azithromycin for CAP   - wean steroids to oral 40mg pred in am tomorrow  - supportive care for viral, cough  wean o2 as rivka

## 2024-01-24 ENCOUNTER — TRANSCRIPTION ENCOUNTER (OUTPATIENT)
Age: 79
End: 2024-01-24

## 2024-01-24 PROBLEM — G45.9 TRANSIENT CEREBRAL ISCHEMIC ATTACK, UNSPECIFIED: Chronic | Status: ACTIVE | Noted: 2024-01-22

## 2024-01-24 PROBLEM — E78.5 HYPERLIPIDEMIA, UNSPECIFIED: Chronic | Status: ACTIVE | Noted: 2024-01-22

## 2024-01-24 LAB
A1C WITH ESTIMATED AVERAGE GLUCOSE RESULT: 6.4 % — HIGH (ref 4–5.6)
ESTIMATED AVERAGE GLUCOSE: 137 MG/DL — HIGH (ref 68–114)
GLUCOSE BLDC GLUCOMTR-MCNC: 158 MG/DL — HIGH (ref 70–99)
GLUCOSE BLDC GLUCOMTR-MCNC: 189 MG/DL — HIGH (ref 70–99)
GLUCOSE BLDC GLUCOMTR-MCNC: 242 MG/DL — HIGH (ref 70–99)
GLUCOSE BLDC GLUCOMTR-MCNC: 287 MG/DL — HIGH (ref 70–99)
GRAM STN FLD: ABNORMAL
HCV AB S/CO SERPL IA: 0.13 S/CO — SIGNIFICANT CHANGE UP (ref 0–0.99)
HCV AB SERPL-IMP: SIGNIFICANT CHANGE UP
S PNEUM AG UR QL: NEGATIVE — SIGNIFICANT CHANGE UP
SPECIMEN SOURCE: SIGNIFICANT CHANGE UP

## 2024-01-24 PROCEDURE — 99233 SBSQ HOSP IP/OBS HIGH 50: CPT

## 2024-01-24 RX ORDER — SODIUM CHLORIDE 0.65 %
1 AEROSOL, SPRAY (ML) NASAL
Refills: 0 | Status: DISCONTINUED | OUTPATIENT
Start: 2024-01-24 | End: 2024-01-25

## 2024-01-24 RX ORDER — LACTOBACILLUS ACIDOPHILUS 100MM CELL
1 CAPSULE ORAL DAILY
Refills: 0 | Status: DISCONTINUED | OUTPATIENT
Start: 2024-01-24 | End: 2024-01-25

## 2024-01-24 RX ORDER — ALBUTEROL 90 UG/1
2.5 AEROSOL, METERED ORAL EVERY 6 HOURS
Refills: 0 | Status: DISCONTINUED | OUTPATIENT
Start: 2024-01-24 | End: 2024-01-25

## 2024-01-24 RX ORDER — ALBUTEROL 90 UG/1
2.5 AEROSOL, METERED ORAL
Refills: 0 | Status: DISCONTINUED | OUTPATIENT
Start: 2024-01-24 | End: 2024-01-24

## 2024-01-24 RX ORDER — FLUTICASONE PROPIONATE 50 MCG
1 SPRAY, SUSPENSION NASAL
Refills: 0 | Status: DISCONTINUED | OUTPATIENT
Start: 2024-01-24 | End: 2024-01-25

## 2024-01-24 RX ADMIN — Medication 40 MILLIGRAM(S): at 14:44

## 2024-01-24 RX ADMIN — Medication 40 MILLIGRAM(S): at 06:09

## 2024-01-24 RX ADMIN — Medication 3 MILLILITER(S): at 20:22

## 2024-01-24 RX ADMIN — CEFTRIAXONE 1000 MILLIGRAM(S): 500 INJECTION, POWDER, FOR SOLUTION INTRAMUSCULAR; INTRAVENOUS at 17:25

## 2024-01-24 RX ADMIN — Medication 2: at 11:36

## 2024-01-24 RX ADMIN — Medication 1 SPRAY(S): at 17:25

## 2024-01-24 RX ADMIN — Medication 650 MILLIGRAM(S): at 21:44

## 2024-01-24 RX ADMIN — ENOXAPARIN SODIUM 40 MILLIGRAM(S): 100 INJECTION SUBCUTANEOUS at 06:09

## 2024-01-24 RX ADMIN — Medication 1 SPRAY(S): at 14:44

## 2024-01-24 RX ADMIN — Medication 100 MILLIGRAM(S): at 21:44

## 2024-01-24 RX ADMIN — AZITHROMYCIN 255 MILLIGRAM(S): 500 TABLET, FILM COATED ORAL at 17:25

## 2024-01-24 RX ADMIN — Medication 1 TABLET(S): at 14:44

## 2024-01-24 RX ADMIN — Medication 600 MILLIGRAM(S): at 17:25

## 2024-01-24 RX ADMIN — Medication 3 MILLILITER(S): at 08:19

## 2024-01-24 RX ADMIN — Medication 3: at 17:26

## 2024-01-24 RX ADMIN — Medication 100 MILLIGRAM(S): at 14:44

## 2024-01-24 RX ADMIN — Medication 40 MILLIGRAM(S): at 21:44

## 2024-01-24 RX ADMIN — Medication 1: at 08:33

## 2024-01-24 RX ADMIN — Medication 3 MILLILITER(S): at 15:18

## 2024-01-24 NOTE — DISCHARGE NOTE NURSING/CASE MANAGEMENT/SOCIAL WORK - NSDCPEFALRISK_GEN_ALL_CORE
For information on Fall & Injury Prevention, visit: [email or web address] OR  [email or web address] OR  https://www.cdc.gov/steadi/patient.html

## 2024-01-24 NOTE — DISCHARGE NOTE PROVIDER - ATTENDING DISCHARGE PHYSICAL EXAMINATION:
VITALS:   T(C): 36.4 (01-25-24 @ 09:37), Max: 37.2 (01-24-24 @ 17:12)  HR: 93 (01-25-24 @ 09:37) (80 - 110)  BP: 131/81 (01-25-24 @ 09:37) (119/60 - 150/81)  RR: 18 (01-25-24 @ 09:37) (18 - 18)  SpO2: 98% (01-25-24 @ 09:37) (93% - 98%)    GENERAL: NAD, lying in bed comfortably   HEAD:  Atraumatic, normocephalic  EYES: EOMI, PERRLA, conjunctiva and sclera clear  ENT: Moist mucous membranes  NECK: Supple, no JVD  HEART: Regular rate and rhythm, no murmurs, rubs, or gallops  LUNGS: Unlabored respirations.  Clear to auscultation bilaterally, no crackles, wheezing, or rhonchi  ABDOMEN: Soft, nontender, nondistended, +BS  EXTREMITIES: 2+ peripheral pulses bilaterally. No clubbing, cyanosis, or edema  NERVOUS SYSTEM:  A&Ox3, no focal deficits   SKIN: No rashes or lesions

## 2024-01-24 NOTE — DISCHARGE NOTE PROVIDER - PROVIDER RX CONTACT NUMBER
Pt scheduled for scope. Instructions mailed to home and sent via [employer].     
[phone]

## 2024-01-24 NOTE — DISCHARGE NOTE PROVIDER - NSDCFUADDAPPT_GEN_ALL_CORE_FT
Please see below for post hospital follow up information     1. [facility] Meds To Bed: [phone]    2. Home Care:[facility]    3. Transitional Care Services: [phone]    4, A. Primary Care Appointment:PCP- Appointment made with  Dr. Mccabe  on  2/5   at 10:15 . If you are unable to attend your pre-scheduled appointment, please contact the office directly at [phone] at [address] [address] [place] to reschedule.          4, B. Specialty Appointment:Pulm--   Appointment made with    on  2/22   at  10:30.If you are unable to attend your pre-scheduled appointment, please contact the office directly at [phone]  at [address] [facility]  to reschedule.          5. [facility] Education Packet Provided

## 2024-01-24 NOTE — PROGRESS NOTE ADULT - ATTENDING COMMENTS
Patient is see and evaluated, chart reviewed in detail, agree with assessment and plan of Resident and student  patient's pain is well controlled, has no complains  Decrease air entry b/l   S1 S2 audible   Continue with antibiotics for possible bacterial Pneumonia  Supportive care for [place] Pneumo virus infection   IS   cough syrup   IV steroids   Will continue with current plan of care as above

## 2024-01-24 NOTE — DISCHARGE NOTE PROVIDER - NSDCMRMEDTOKEN_GEN_ALL_CORE_FT
aspirin 81 mg oral capsule: 1 cap(s) orally once a day  Crestor 20 mg oral tablet: 1 tab(s) orally once a day  naproxen 375 mg (as sodium) oral tablet, extended release: 1 tab(s) orally 2 times a day as needed for  moderate pain  Plavix 75 mg oral tablet: 1 tab(s) orally once a day  verapamil 100 mg/24 hours oral capsule, extended release: 1 cap(s) orally once a day   aspirin 81 mg oral capsule: 1 cap(s) orally once a day  Crestor 20 mg oral tablet: 1 tab(s) orally once a day  Medrol Dosepak 4 mg oral tablet: 1 packet(s) orally once a day Please take as directed  naproxen 375 mg (as sodium) oral tablet, extended release: 1 tab(s) orally 2 times a day as needed for  moderate pain  Plavix 75 mg oral tablet: 1 tab(s) orally once a day  verapamil 100 mg/24 hours oral capsule, extended release: 1 cap(s) orally once a day   aspirin 81 mg oral capsule: 1 cap(s) orally once a day  Crestor 20 mg oral tablet: 1 tab(s) orally once a day  ipratropium-albuterol 0.5 mg-2.5 mg/3 mL inhalation solution: 3 milliliter(s) by nebulizer every 8 hours  Medrol Dosepak 4 mg oral tablet: 1 packet(s) orally once a day Please take as directed  naproxen 375 mg (as sodium) oral tablet, extended release: 1 tab(s) orally 2 times a day as needed for  moderate pain  Nebulizer: Use nebulizer every 8 hrs as needed  Plavix 75 mg oral tablet: 1 tab(s) orally once a day  verapamil 100 mg/24 hours oral capsule, extended release: 1 cap(s) orally once a day

## 2024-01-24 NOTE — DISCHARGE NOTE PROVIDER - NSDCFUSCHEDAPPT_GEN_ALL_CORE_FT
David Iyer  [facility]  PULED [address]  Scheduled Appointment: 02/22/2024

## 2024-01-24 NOTE — DISCHARGE NOTE NURSING/CASE MANAGEMENT/SOCIAL WORK - PATIENT PORTAL LINK FT
You can access the FollowMyHealth Patient Portal offered by [facility] by registering at the following website: [email or web address]. By joining [employer]’s FollowMyHealth portal, you will also be able to view your health information using other applications (apps) compatible with our system.

## 2024-01-24 NOTE — PROGRESS NOTE ADULT - ASSESSMENT
78M with PMHX TIA, HTN, HLD presents to [facility] ER c/o Cough, SOB, and Fever Tmax 103F which began few days ago admitted for Acute Hypoxic Respiratory Failure 2/2 RLL PNA and HMPV.    PLAN:  Acute Hypoxic Respiratory Failure 2/2 RLL PNA and HMPV  - No leukocytosis  - Procal negative   - RVP, hMPV and COVID+   - BCx, no growth 24 hrs  - UCx, contaminent   - Sputum culture, pending result   - CT Chest IVC +RLL PNA  - O2 via NC PRN SPO2 >92%  - Duoneb q6  - Empiric IV ABX with Ceftriaxone/Azithromycin  - Solumedrol 40 mg IV q8   - Pulm consult, pending recs   - TTE, EF 63   - isolation precautions    TIA, HTN, HLD  -C/w ASA 81mg,  Plavix 75mg q24, Verapamil ER 100mg q24  -C/w Crestor 20mg    DC: acute    78M with PMHX TIA, HTN, HLD presents to [facility] ER c/o Cough, SOB, and Fever Tmax 103F which began few days ago admitted for Acute Hypoxic Respiratory Failure 2/2 RLL PNA and HMPV.    PLAN:  Acute Hypoxic Respiratory Failure 2/2 RLL PNA and HMPV  - No leukocytosis  - Procal negative   - RVP, hMPV   - BCx, no growth 24 hrs  - UCx, contaminent   - Sputum culture, pending result   - CT Chest IVC +RLL PNA  - O2 via NC PRN SPO2 >92%  - Duoneb q6  - Empiric IV ABX with Ceftriaxone/Azithromycin  - Solumedrol 40 mg IV q8   - Pulm consult, recs appreciated   - TTE, EF 63   - isolation precautions    TIA, HTN, HLD  -C/w ASA 81mg,  Plavix 75mg q24, Verapamil ER 100mg q24  -C/w Crestor 20mg    DC: DC in 2 days to home   78M with PMHX TIA, HTN, HLD presents to [facility] ER c/o Cough, SOB, and Fever Tmax 103F which began few days ago admitted for Acute Hypoxic Respiratory Failure 2/2 RLL PNA and HMPV.    PLAN:    Acute Hypoxic Respiratory Failure 2/2 RLL PNA and HMPV  - No leukocytosis  - Procal negative   - RVP, hMPV   - BCx, no growth 24 hrs  - UCx, contaminative   - Sputum culture, pending result   - CT Chest IVC +RLL PNA  - O2 via NC PRN SPO2 >92%  - Duoneb q6  - Empiric IV ABX with Ceftriaxone/Azithromycin  - Solumedrol 40 mg IV q8, will samir steriods   - Pulm consult, recs appreciated   - TTE, EF 63   - isolation precautions  -IS   -Robitussin    TIA, HTN, HLD  -C/w ASA 81mg,  Plavix 75mg q24, Verapamil ER 100mg q24  -C/w Crestor 20mg    DC: DC in 2 days to home

## 2024-01-24 NOTE — DISCHARGE NOTE PROVIDER - HOSPITAL COURSE
78M with PMHX TIA, HTN, HLD presents to [facility] ER c/o Cough, SOB, and Fever Tmax 103F which began few days ago admitted for Acute Hypoxic Respiratory Failure 2/2 RLL PNA and HMPV. Patient was admitted for acute Hypoxic Respiratory Failure 2/2 RLL PNA and HMPV, patient weaned of oxygen, abx completed. Patient medically stable for discharge.      77 y/o M with PMH of TIA, HTN, HLD presented with complaints of cough, fever and shortness of breath admitted for acute hypoxic respiratory failure secondary to right lower lobe pneumonia and human meta pneumovirus infection. Blood cultures were negative. The patient was weaned off supplemental O2.

## 2024-01-24 NOTE — DISCHARGE NOTE NURSING/CASE MANAGEMENT/SOCIAL WORK - NSDCFUADDAPPT_GEN_ALL_CORE_FT
Please see below for post hospital follow up information     1. [facility] Meds To Bed: [phone]    2. Home Care:[facility]    3. Transitional Care Services: [phone]    4, A. Primary Care Appointment:PCP- Appointment made with  Dr. Mccabe  on  2/5   at 10:15 . If you are unable to attend your pre-scheduled appointment, please contact the office directly at [phone] at [address] [place] to reschedule.          4, B. Specialty Appointment:Pulm--   Appointment made with    on  2/22   at  10:30.If you are unable to attend your pre-scheduled appointment, please contact the office directly at [phone]  at [address] [facility]  to reschedule.          5. [facility] Education Packet Provided

## 2024-01-24 NOTE — DISCHARGE NOTE PROVIDER - NSDCCPCAREPLAN_GEN_ALL_CORE_FT
PRINCIPAL DISCHARGE DIAGNOSIS  Diagnosis: Pneumonia due to human metapneumovirus  Assessment and Plan of Treatment: Patient completed antibiotics, off oxygen, ambulating without desaturation. Follow up with PCP outpatient.      SECONDARY DISCHARGE DIAGNOSES  Diagnosis: Hypoxia  Assessment and Plan of Treatment: resolved    Diagnosis: HTN (hypertension)  Assessment and Plan of Treatment: Continue with home medications     PRINCIPAL DISCHARGE DIAGNOSIS  Diagnosis: Pneumonia due to human metapneumovirus  Assessment and Plan of Treatment: - Please complete steroid taper as prescribed  - Follow up with your primary care physician in 1 week      SECONDARY DISCHARGE DIAGNOSES  Diagnosis: Hypoxia  Assessment and Plan of Treatment: resolved    Diagnosis: HTN (hypertension)  Assessment and Plan of Treatment: Continue with home medications

## 2024-01-24 NOTE — DISCHARGE NOTE PROVIDER - CARE PROVIDER_API CALL
Milind Mccray  Internal Medicine  [address] [place] [id number]  Phone: [phone]  Fax: [phone]  Follow Up Time: 1 week

## 2024-01-24 NOTE — PROGRESS NOTE ADULT - SUBJECTIVE AND OBJECTIVE BOX
BEHZAD SANTILLAN 78y Female  MRN#: [id number]   CODE STATUS:___Full Code_____      SUBJECTIVE  Patient is a 78y old Female who presents with a chief complaint of SOB/Cough (23 Jan 2024 17:32)  Currently admitted to medicine with the primary diagnosis of Pneumonia due to human metapneumovirus    Today is hospital day 1d, and this morning she is _________ and reports no overnight events.     REVIEW OF SYSTEMS:    All other review of systems is negative unless indicated above.    OBJECTIVE  PAST MEDICAL & SURGICAL HISTORY  Hypertension    Hyperlipidemia    TIA (transient ischemic attack)      Home Meds:  aspirin 81 mg oral capsule: 1 cap(s) orally once a day  Crestor 20 mg oral tablet: 1 tab(s) orally once a day  naproxen 375 mg (as sodium) oral tablet, extended release: 1 tab(s) orally 2 times a day as needed for  moderate pain  Plavix 75 mg oral tablet: 1 tab(s) orally once a day  verapamil 100 mg/24 hours oral capsule, extended release: 1 cap(s) orally once a day    ALLERGIES:  No Known Allergies    MEDICATIONS:  STANDING MEDICATIONS  albuterol/ipratropium for Nebulization 3 milliLiter(s) Nebulizer every 6 hours  azithromycin  IVPB 500 milliGRAM(s) IV Intermittent every 24 hours  cefTRIAXone Injectable. 1000 milliGRAM(s) IV Push every 24 hours  dextrose 5%. 1000 milliLiter(s) IV Continuous <Continuous>  dextrose 5%. 1000 milliLiter(s) IV Continuous <Continuous>  dextrose 50% Injectable 25 Gram(s) IV Push once  dextrose 50% Injectable 12.5 Gram(s) IV Push once  dextrose 50% Injectable 25 Gram(s) IV Push once  enoxaparin Injectable 40 milliGRAM(s) SubCutaneous every 24 hours  glucagon  Injectable 1 milliGRAM(s) IntraMuscular once  insulin lispro (ADMELOG) corrective regimen sliding scale   SubCutaneous at bedtime  insulin lispro (ADMELOG) corrective regimen sliding scale   SubCutaneous three times a day before meals  methylPREDNISolone sodium succinate Injectable 40 milliGRAM(s) IV Push every 8 hours    PRN MEDICATIONS  acetaminophen     Tablet .. 650 milliGRAM(s) Oral every 6 hours PRN  aluminum hydroxide/magnesium hydroxide/simethicone Suspension 30 milliLiter(s) Oral every 4 hours PRN  dextrose Oral Gel 15 Gram(s) Oral once PRN  hydrocodone/homatropine Syrup 5 milliLiter(s) Oral every 12 hours PRN  melatonin 3 milliGRAM(s) Oral at bedtime PRN  ondansetron Injectable 4 milliGRAM(s) IV Push every 8 hours PRN      VITAL SIGNS: Last 24 Hours  T(C): 36.5 (24 Jan 2024 04:48), Max: 38.1 (23 Jan 2024 08:40)  T(F): 97.7 (24 Jan 2024 04:48), Max: 100.5 (23 Jan 2024 08:40)  HR: 89 (24 Jan 2024 04:48) (79 - 115)  BP: 121/74 (24 Jan 2024 04:48) (106/56 - 149/81)  BP(mean): 104 (23 Jan 2024 08:40) (104 - 104)  RR: 18 (24 Jan 2024 04:48) (18 - 18)  SpO2: 93% (24 Jan 2024 04:48) (91% - 96%)    LABS:      RADIOLOGY:      PHYSICAL EXAM:  General: NAD, AAOx3  HEENT: atraumatic, normocephalic  Pulmonary: clear to auscultation bilaterally; No wheeze  Cardiovascular: Regular rate and rhythm; no murmurs, rubs or gallops. Normal S1S2  Gastrointestinal: Soft, nontender, nondistended; bowel sounds present  Musculoskeletal: 2+ peripheral pulses, no clubbing, cyanosis or edema  Neurology: Pt. alert and oriented, fluent speech, able to move all extremities  Skin: no rashes or lesions             BEHZAD SANTILLAN 78y Female  MRN#: [id number]   CODE STATUS:___Full Code_____      SUBJECTIVE  Patient is a 78y old Female who presents with a chief complaint of SOB/Cough (23 Jan 2024 17:32)  Currently admitted to medicine with the primary diagnosis of Pneumonia due to human metapneumovirus    Today is hospital day 1d, and this morning she is A&Ox3, hard of hearing, complaining of cough, non-productive and reports no overnight events. Son at bedside and other son over the phone, discussed with son the treatment plan, answered all questions in detail.     REVIEW OF SYSTEMS:    All other review of systems is negative unless indicated above.    OBJECTIVE  PAST MEDICAL & SURGICAL HISTORY  Hypertension    Hyperlipidemia    TIA (transient ischemic attack)      Home Meds:  aspirin 81 mg oral capsule: 1 cap(s) orally once a day  Crestor 20 mg oral tablet: 1 tab(s) orally once a day  naproxen 375 mg (as sodium) oral tablet, extended release: 1 tab(s) orally 2 times a day as needed for  moderate pain  Plavix 75 mg oral tablet: 1 tab(s) orally once a day  verapamil 100 mg/24 hours oral capsule, extended release: 1 cap(s) orally once a day    ALLERGIES:  No Known Allergies    MEDICATIONS:  STANDING MEDICATIONS  albuterol/ipratropium for Nebulization 3 milliLiter(s) Nebulizer every 6 hours  azithromycin  IVPB 500 milliGRAM(s) IV Intermittent every 24 hours  cefTRIAXone Injectable. 1000 milliGRAM(s) IV Push every 24 hours  dextrose 5%. 1000 milliLiter(s) IV Continuous <Continuous>  dextrose 5%. 1000 milliLiter(s) IV Continuous <Continuous>  dextrose 50% Injectable 25 Gram(s) IV Push once  dextrose 50% Injectable 12.5 Gram(s) IV Push once  dextrose 50% Injectable 25 Gram(s) IV Push once  enoxaparin Injectable 40 milliGRAM(s) SubCutaneous every 24 hours  glucagon  Injectable 1 milliGRAM(s) IntraMuscular once  insulin lispro (ADMELOG) corrective regimen sliding scale   SubCutaneous at bedtime  insulin lispro (ADMELOG) corrective regimen sliding scale   SubCutaneous three times a day before meals  methylPREDNISolone sodium succinate Injectable 40 milliGRAM(s) IV Push every 8 hours    PRN MEDICATIONS  acetaminophen     Tablet .. 650 milliGRAM(s) Oral every 6 hours PRN  aluminum hydroxide/magnesium hydroxide/simethicone Suspension 30 milliLiter(s) Oral every 4 hours PRN  dextrose Oral Gel 15 Gram(s) Oral once PRN  hydrocodone/homatropine Syrup 5 milliLiter(s) Oral every 12 hours PRN  melatonin 3 milliGRAM(s) Oral at bedtime PRN  ondansetron Injectable 4 milliGRAM(s) IV Push every 8 hours PRN      VITAL SIGNS: Last 24 Hours  T(C): 36.5 (24 Jan 2024 04:48), Max: 38.1 (23 Jan 2024 08:40)  T(F): 97.7 (24 Jan 2024 04:48), Max: 100.5 (23 Jan 2024 08:40)  HR: 89 (24 Jan 2024 04:48) (79 - 115)  BP: 121/74 (24 Jan 2024 04:48) (106/56 - 149/81)  BP(mean): 104 (23 Jan 2024 08:40) (104 - 104)  RR: 18 (24 Jan 2024 04:48) (18 - 18)  SpO2: 93% (24 Jan 2024 04:48) (91% - 96%)    LABS:                  13.6   5.15  )-----------( 169      ( 23 Jan 2024 06:30 )             41.2     01-23    138  |  103  |  8.2  ----------------------------<  106<H>  4.2   |  26.0  |  0.68    Ca    8.3<L>      23 Jan 2024 06:30    TPro  6.2<L>  /  Alb  3.6  /  TBili  0.3<L>  /  DBili  x   /  AST  18  /  ALT  11  /  AlkPhos  74  01-23    LIVER FUNCTIONS - ( 23 Jan 2024 06:30 )  Alb: 3.6 g/dL / Pro: 6.2 g/dL / ALK PHOS: 74 U/L / ALT: 11 U/L / AST: 18 U/L / GGT: x             Urinalysis Basic - ( 23 Jan 2024 06:30 )    Color: x / Appearance: x / SG: x / pH: x  Gluc: 106 mg/dL / Ketone: x  / Bili: x / Urobili: x   Blood: x / Protein: x / Nitrite: x   Leuk Esterase: x / RBC: x / WBC x   Sq Epi: x / Non Sq Epi: x / Bacteria: x          RADIOLOGY:  < from: CT Angio Chest PE Protocol w/ IV Cont (01.22.24 @ 22:56) >  IMPRESSION:  No pulmonary embolism.    Right lower lobe posteromedial lung consolidation compatible with   pneumonia. Trace right pleural effusion.    Mild bilateral hilar and mediastinal lymphadenopathy.    < end of copied text >      PHYSICAL EXAM:  General: NAD, AAOx3, sitting in bed off O2   HEENT: atraumatic, normocephalic  Pulmonary: clear to auscultation bilaterally; No wheeze  Cardiovascular: Regular rate and rhythm; no murmurs, rubs or gallops. Normal S1S2  Gastrointestinal: Soft, nontender, nondistended; bowel sounds present  Musculoskeletal: 2+ peripheral pulses, no clubbing, cyanosis or edema  Neurology: Pt. alert and oriented, fluent speech, able to move all extremities  Skin: no rashes or lesions             BEHZAD SANTILLAN 78y Female  MRN#: [id number]   CODE STATUS:___Full Code_____      SUBJECTIVE  Patient is a 78y old Female who presents with a chief complaint of SOB/Cough (23 Jan 2024 17:32)  Currently admitted to medicine with the primary diagnosis of Pneumonia due to human metapneumovirus    Today is hospital day 2, and this morning she is A&Ox3, hard of hearing, complaining of cough, non-productive and reports no overnight events. Son at bedside and other son over the phone, discussed with son the treatment plan, answered all questions in detail.       OBJECTIVE  PAST MEDICAL & SURGICAL HISTORY  Hypertension    Hyperlipidemia    TIA (transient ischemic attack)      Home Meds:  aspirin 81 mg oral capsule: 1 cap(s) orally once a day  Crestor 20 mg oral tablet: 1 tab(s) orally once a day  naproxen 375 mg (as sodium) oral tablet, extended release: 1 tab(s) orally 2 times a day as needed for  moderate pain  Plavix 75 mg oral tablet: 1 tab(s) orally once a day  verapamil 100 mg/24 hours oral capsule, extended release: 1 cap(s) orally once a day    ALLERGIES:  No Known Allergies    MEDICATIONS:  STANDING MEDICATIONS  albuterol/ipratropium for Nebulization 3 milliLiter(s) Nebulizer every 6 hours  azithromycin  IVPB 500 milliGRAM(s) IV Intermittent every 24 hours  cefTRIAXone Injectable. 1000 milliGRAM(s) IV Push every 24 hours  dextrose 5%. 1000 milliLiter(s) IV Continuous <Continuous>  dextrose 5%. 1000 milliLiter(s) IV Continuous <Continuous>  dextrose 50% Injectable 25 Gram(s) IV Push once  dextrose 50% Injectable 12.5 Gram(s) IV Push once  dextrose 50% Injectable 25 Gram(s) IV Push once  enoxaparin Injectable 40 milliGRAM(s) SubCutaneous every 24 hours  glucagon  Injectable 1 milliGRAM(s) IntraMuscular once  insulin lispro (ADMELOG) corrective regimen sliding scale   SubCutaneous at bedtime  insulin lispro (ADMELOG) corrective regimen sliding scale   SubCutaneous three times a day before meals  methylPREDNISolone sodium succinate Injectable 40 milliGRAM(s) IV Push every 8 hours    PRN MEDICATIONS  acetaminophen     Tablet .. 650 milliGRAM(s) Oral every 6 hours PRN  aluminum hydroxide/magnesium hydroxide/simethicone Suspension 30 milliLiter(s) Oral every 4 hours PRN  dextrose Oral Gel 15 Gram(s) Oral once PRN  hydrocodone/homatropine Syrup 5 milliLiter(s) Oral every 12 hours PRN  melatonin 3 milliGRAM(s) Oral at bedtime PRN  ondansetron Injectable 4 milliGRAM(s) IV Push every 8 hours PRN      VITAL SIGNS: Last 24 Hours  T(C): 36.5 (24 Jan 2024 04:48), Max: 38.1 (23 Jan 2024 08:40)  T(F): 97.7 (24 Jan 2024 04:48), Max: 100.5 (23 Jan 2024 08:40)  HR: 89 (24 Jan 2024 04:48) (79 - 115)  BP: 121/74 (24 Jan 2024 04:48) (106/56 - 149/81)  BP(mean): 104 (23 Jan 2024 08:40) (104 - 104)  RR: 18 (24 Jan 2024 04:48) (18 - 18)  SpO2: 93% (24 Jan 2024 04:48) (91% - 96%)    LABS:                  13.6   5.15  )-----------( 169      ( 23 Jan 2024 06:30 )             41.2     01-23    138  |  103  |  8.2  ----------------------------<  106<H>  4.2   |  26.0  |  0.68    Ca    8.3<L>      23 Jan 2024 06:30    TPro  6.2<L>  /  Alb  3.6  /  TBili  0.3<L>  /  DBili  x   /  AST  18  /  ALT  11  /  AlkPhos  74  01-23    LIVER FUNCTIONS - ( 23 Jan 2024 06:30 )  Alb: 3.6 g/dL / Pro: 6.2 g/dL / ALK PHOS: 74 U/L / ALT: 11 U/L / AST: 18 U/L / GGT: x                 RADIOLOGY:  < from: CT Angio Chest PE Protocol w/ IV Cont (01.22.24 @ 22:56) >  IMPRESSION:  No pulmonary embolism.    Right lower lobe posteromedial lung consolidation compatible with   pneumonia. Trace right pleural effusion.    Mild bilateral hilar and mediastinal lymphadenopathy.    < end of copied text >      PHYSICAL EXAM:  General: NAD, AAOx3, sitting in bed off O2   HEENT: atraumatic, normocephalic  Pulmonary: Decrease air entry bilaterally; No wheeze, coughing while encounter  Cardiovascular: Regular rate and rhythm; no murmurs,  Gastrointestinal: Soft, nontender, nondistended; bowel sounds present  Musculoskeletal: 2+ peripheral pulses, no edema  Neurology: Pt. alert and oriented, able to move all extremities  Skin: no rashes or lesions

## 2024-01-25 VITALS
TEMPERATURE: 98 F | OXYGEN SATURATION: 95 % | SYSTOLIC BLOOD PRESSURE: 128 MMHG | DIASTOLIC BLOOD PRESSURE: 72 MMHG | HEART RATE: 90 BPM | RESPIRATION RATE: 18 BRPM

## 2024-01-25 LAB
ALBUMIN SERPL ELPH-MCNC: 3.5 G/DL — SIGNIFICANT CHANGE UP (ref 3.3–5.2)
ALP SERPL-CCNC: 68 U/L — SIGNIFICANT CHANGE UP (ref 40–120)
ALT FLD-CCNC: 13 U/L — SIGNIFICANT CHANGE UP
ANION GAP SERPL CALC-SCNC: 16 MMOL/L — SIGNIFICANT CHANGE UP (ref 5–17)
AST SERPL-CCNC: 15 U/L — SIGNIFICANT CHANGE UP
BILIRUB SERPL-MCNC: <0.2 MG/DL — LOW (ref 0.4–2)
BUN SERPL-MCNC: 12.9 MG/DL — SIGNIFICANT CHANGE UP (ref 8–20)
CALCIUM SERPL-MCNC: 8.9 MG/DL — SIGNIFICANT CHANGE UP (ref 8.4–10.5)
CHLORIDE SERPL-SCNC: 102 MMOL/L — SIGNIFICANT CHANGE UP (ref 96–108)
CO2 SERPL-SCNC: 21 MMOL/L — LOW (ref 22–29)
CREAT SERPL-MCNC: 0.53 MG/DL — SIGNIFICANT CHANGE UP (ref 0.5–1.3)
EGFR: 96 ML/MIN/1.73M2 — SIGNIFICANT CHANGE UP
GLUCOSE BLDC GLUCOMTR-MCNC: 140 MG/DL — HIGH (ref 70–99)
GLUCOSE BLDC GLUCOMTR-MCNC: 298 MG/DL — HIGH (ref 70–99)
GLUCOSE SERPL-MCNC: 163 MG/DL — HIGH (ref 70–99)
HCT VFR BLD CALC: 37.5 % — SIGNIFICANT CHANGE UP (ref 34.5–45)
HGB BLD-MCNC: 12.3 G/DL — SIGNIFICANT CHANGE UP (ref 11.5–15.5)
LEGIONELLA AG UR QL: NEGATIVE — SIGNIFICANT CHANGE UP
MAGNESIUM SERPL-MCNC: 2.3 MG/DL — SIGNIFICANT CHANGE UP (ref 1.6–2.6)
MCHC RBC-ENTMCNC: 27.4 PG — SIGNIFICANT CHANGE UP (ref 27–34)
MCHC RBC-ENTMCNC: 32.8 GM/DL — SIGNIFICANT CHANGE UP (ref 32–36)
MCV RBC AUTO: 83.5 FL — SIGNIFICANT CHANGE UP (ref 80–100)
PHOSPHATE SERPL-MCNC: 2.7 MG/DL — SIGNIFICANT CHANGE UP (ref 2.4–4.7)
PLATELET # BLD AUTO: 216 K/UL — SIGNIFICANT CHANGE UP (ref 150–400)
POTASSIUM SERPL-MCNC: 3.9 MMOL/L — SIGNIFICANT CHANGE UP (ref 3.5–5.3)
POTASSIUM SERPL-SCNC: 3.9 MMOL/L — SIGNIFICANT CHANGE UP (ref 3.5–5.3)
PROT SERPL-MCNC: 6.6 G/DL — SIGNIFICANT CHANGE UP (ref 6.6–8.7)
RBC # BLD: 4.49 M/UL — SIGNIFICANT CHANGE UP (ref 3.8–5.2)
RBC # FLD: 14.9 % — HIGH (ref 10.3–14.5)
SODIUM SERPL-SCNC: 139 MMOL/L — SIGNIFICANT CHANGE UP (ref 135–145)
WBC # BLD: 6.29 K/UL — SIGNIFICANT CHANGE UP (ref 3.8–10.5)
WBC # FLD AUTO: 6.29 K/UL — SIGNIFICANT CHANGE UP (ref 3.8–10.5)

## 2024-01-25 PROCEDURE — 36415 COLL VENOUS BLD VENIPUNCTURE: CPT

## 2024-01-25 PROCEDURE — 80053 COMPREHEN METABOLIC PANEL: CPT

## 2024-01-25 PROCEDURE — 87641 MR-STAPH DNA AMP PROBE: CPT

## 2024-01-25 PROCEDURE — 84484 ASSAY OF TROPONIN QUANT: CPT

## 2024-01-25 PROCEDURE — 94640 AIRWAY INHALATION TREATMENT: CPT

## 2024-01-25 PROCEDURE — 93306 TTE W/DOPPLER COMPLETE: CPT

## 2024-01-25 PROCEDURE — 87899 AGENT NOS ASSAY W/OPTIC: CPT

## 2024-01-25 PROCEDURE — 96374 THER/PROPH/DIAG INJ IV PUSH: CPT

## 2024-01-25 PROCEDURE — 0225U NFCT DS DNA&RNA 21 SARSCOV2: CPT

## 2024-01-25 PROCEDURE — 83605 ASSAY OF LACTIC ACID: CPT

## 2024-01-25 PROCEDURE — 71045 X-RAY EXAM CHEST 1 VIEW: CPT

## 2024-01-25 PROCEDURE — 87449 NOS EACH ORGANISM AG IA: CPT

## 2024-01-25 PROCEDURE — 86803 HEPATITIS C AB TEST: CPT

## 2024-01-25 PROCEDURE — 94760 N-INVAS EAR/PLS OXIMETRY 1: CPT

## 2024-01-25 PROCEDURE — 96375 TX/PRO/DX INJ NEW DRUG ADDON: CPT

## 2024-01-25 PROCEDURE — 87640 STAPH A DNA AMP PROBE: CPT

## 2024-01-25 PROCEDURE — 82962 GLUCOSE BLOOD TEST: CPT

## 2024-01-25 PROCEDURE — 87086 URINE CULTURE/COLONY COUNT: CPT

## 2024-01-25 PROCEDURE — 83880 ASSAY OF NATRIURETIC PEPTIDE: CPT

## 2024-01-25 PROCEDURE — 85027 COMPLETE CBC AUTOMATED: CPT

## 2024-01-25 PROCEDURE — 84145 PROCALCITONIN (PCT): CPT

## 2024-01-25 PROCEDURE — 99285 EMERGENCY DEPT VISIT HI MDM: CPT | Mod: 25

## 2024-01-25 PROCEDURE — 87070 CULTURE OTHR SPECIMN AEROBIC: CPT

## 2024-01-25 PROCEDURE — 87040 BLOOD CULTURE FOR BACTERIA: CPT

## 2024-01-25 PROCEDURE — 71275 CT ANGIOGRAPHY CHEST: CPT | Mod: MA

## 2024-01-25 PROCEDURE — 85025 COMPLETE CBC W/AUTO DIFF WBC: CPT

## 2024-01-25 PROCEDURE — 83036 HEMOGLOBIN GLYCOSYLATED A1C: CPT

## 2024-01-25 PROCEDURE — 84100 ASSAY OF PHOSPHORUS: CPT

## 2024-01-25 PROCEDURE — 81001 URINALYSIS AUTO W/SCOPE: CPT

## 2024-01-25 PROCEDURE — 99239 HOSP IP/OBS DSCHRG MGMT >30: CPT

## 2024-01-25 PROCEDURE — 83735 ASSAY OF MAGNESIUM: CPT

## 2024-01-25 RX ORDER — IPRATROPIUM/ALBUTEROL SULFATE 18-103MCG
3 AEROSOL WITH ADAPTER (GRAM) INHALATION
Qty: 90 | Refills: 0
Start: 2024-01-25 | End: 2024-02-23

## 2024-01-25 RX ORDER — CEFPODOXIME PROXETIL 100 MG
1 TABLET ORAL
Qty: 14 | Refills: 0
Start: 2024-01-25 | End: 2024-01-31

## 2024-01-25 RX ADMIN — ENOXAPARIN SODIUM 40 MILLIGRAM(S): 100 INJECTION SUBCUTANEOUS at 06:06

## 2024-01-25 RX ADMIN — Medication 600 MILLIGRAM(S): at 06:07

## 2024-01-25 RX ADMIN — Medication 3: at 12:11

## 2024-01-25 RX ADMIN — Medication 40 MILLIGRAM(S): at 06:06

## 2024-01-25 RX ADMIN — Medication 3 MILLILITER(S): at 16:12

## 2024-01-25 RX ADMIN — Medication 100 MILLIGRAM(S): at 15:01

## 2024-01-25 RX ADMIN — Medication 3 MILLILITER(S): at 04:34

## 2024-01-25 RX ADMIN — Medication 1 SPRAY(S): at 06:07

## 2024-01-25 RX ADMIN — Medication 100 MILLIGRAM(S): at 06:07

## 2024-01-25 RX ADMIN — Medication 3 MILLILITER(S): at 08:45

## 2024-01-25 RX ADMIN — Medication 1 TABLET(S): at 15:01

## 2024-01-25 RX ADMIN — Medication 40 MILLIGRAM(S): at 15:02

## 2024-01-26 ENCOUNTER — TRANSCRIPTION ENCOUNTER (OUTPATIENT)
Age: 79
End: 2024-01-26

## 2024-01-26 LAB
CULTURE RESULTS: ABNORMAL
SPECIMEN SOURCE: SIGNIFICANT CHANGE UP

## 2024-01-27 PROBLEM — I10 ESSENTIAL (PRIMARY) HYPERTENSION: Chronic | Status: ACTIVE | Noted: 2024-01-22

## 2024-01-27 LAB
CULTURE RESULTS: SIGNIFICANT CHANGE UP
CULTURE RESULTS: SIGNIFICANT CHANGE UP
SPECIMEN SOURCE: SIGNIFICANT CHANGE UP
SPECIMEN SOURCE: SIGNIFICANT CHANGE UP

## 2024-01-30 ENCOUNTER — TRANSCRIPTION ENCOUNTER (OUTPATIENT)
Age: 79
End: 2024-01-30

## 2024-01-30 ENCOUNTER — APPOINTMENT (OUTPATIENT)
Age: 79
End: 2024-01-30
Payer: MEDICARE

## 2024-01-30 PROCEDURE — 99495 TRANSJ CARE MGMT MOD F2F 14D: CPT

## 2024-01-30 RX ORDER — BENZONATATE 100 MG/1
100 CAPSULE ORAL
Qty: 10 | Refills: 0 | Status: ACTIVE | COMMUNITY
Start: 2024-01-30 | End: 1900-01-01

## 2024-01-30 RX ORDER — GUAIFENESIN AND DEXTROMETHORPHAN 10; 100 MG/5ML; MG/5ML
10-100 SYRUP ORAL
Qty: 1 | Refills: 0 | Status: ACTIVE | COMMUNITY
Start: 2024-01-30 | End: 1900-01-01

## 2024-01-30 NOTE — ASSESSMENT
[FreeTextEntry1] : Patient is a 78 year old female enrolled in the [facility] TCM program s/p a recent discharge from [facility] 1/23-1/25 for pna. PMH TIA, HTN, HLD . Patient was treated and sent home with HCS. HCS in place. Upon arrival patient alert in nad, denies cp, sob, edema, fever, chills, n/v/d. Reports cough. F/U appointments pul 2/1. Patient was contacted by Cynthia Lora RN from [facility] and medication reconciliation was done with in 48 hours of discharge 1/26.

## 2024-01-30 NOTE — HISTORY OF PRESENT ILLNESS
[Home] : at home, [unfilled] , at the time of the visit. [Other Location: e.g. Home (Enter Location, City,State)___] : at [unfilled] [Verbal consent obtained from patient] : the patient, [unfilled] [Family Member] : family member [FreeTextEntry1] : f/u hospital discharge for pna. [de-identified] : Patient is a 78 year old female enrolled in the [facility] TCM program s/p a recent discharge from [facility] 1/23-1/25 for pna. PMH TIA, HTN, HLD . Patient was treated and sent home with [facility]. HCS in place. Upon arrival patient alert in nad, denies cp, sob, edema, fever, chills, n/v/d. Reports cough. F/U appointments pul 2/1. Patient was contacted by Cynthia Lora RN from [facility] and medication reconciliation was done with in 48 hours of discharge 1/26.  Copied from [facility]: Hospital Course: 77 y/o M with PMH of TIA, HTN, HLD presented with complaints of cough, fever and shortness of breath admitted for acute hypoxic respiratory failure secondary to right lower lobe pneumonia and human meta pneumovirus infection. Blood cultures were negative. The patient was weaned off supplemental O2.

## 2024-01-30 NOTE — PHYSICAL EXAM
[No Acute Distress] : no acute distress [Well Developed] : well developed [Well-Appearing] : well-appearing [Normal Sclera/Conjunctiva] : normal sclera/conjunctiva [Normal Outer Ear/Nose] : the outer ears and nose were normal in appearance [No Respiratory Distress] : no respiratory distress  [Normal] : no joint swelling and grossly normal strength and tone [Normal Affect] : the affect was normal [Alert and Oriented x3] : oriented to person, place, and time [Normal Mood] : the mood was normal [de-identified] : diminished per homecare nurse

## 2024-01-30 NOTE — PLAN
[FreeTextEntry1] : HLD/HTN: c.w medications. f/u cardio.  PNA:p c/w medrol/abx/nebs. start cough medicine. hydration and elevation of bed encouraged. f/u pul 2/1.

## 2024-01-30 NOTE — REVIEW OF SYSTEMS
[Fatigue] : fatigue [Cough] : cough [Dyspnea on Exertion] : dyspnea on exertion [Negative] : Psychiatric

## 2024-01-30 NOTE — COUNSELING
[de-identified] : Complete ABT Adhere to all medications including demonstrating proper use of nebulizers. Increase activity as tolerated and maintain optimal activity levels. Continue coughing and deep breathing exercises including use of Incentive Spirometry. Receive routine pneumococcal and influenza vaccinations. Maintain proper nutrition and adequate hydration. Notify NP for worsening symptoms including fever, chills, SOB, CP, increased cough and secretions. Follow up with MD within 7 days of discharge.

## 2024-01-31 ENCOUNTER — APPOINTMENT (OUTPATIENT)
Dept: CARE COORDINATION | Facility: HOME HEALTH | Age: 79
End: 2024-01-31
Payer: MEDICARE

## 2024-01-31 ENCOUNTER — TRANSCRIPTION ENCOUNTER (OUTPATIENT)
Age: 79
End: 2024-01-31

## 2024-01-31 ENCOUNTER — NON-APPOINTMENT (OUTPATIENT)
Age: 79
End: 2024-01-31

## 2024-01-31 VITALS
DIASTOLIC BLOOD PRESSURE: 58 MMHG | OXYGEN SATURATION: 96 % | HEART RATE: 100 BPM | SYSTOLIC BLOOD PRESSURE: 112 MMHG | RESPIRATION RATE: 23 BRPM

## 2024-01-31 DIAGNOSIS — E78.00 PURE HYPERCHOLESTEROLEMIA, UNSPECIFIED: ICD-10-CM

## 2024-01-31 DIAGNOSIS — J12.3 HUMAN METAPNEUMOVIRUS PNEUMONIA: ICD-10-CM

## 2024-01-31 DIAGNOSIS — I10 ESSENTIAL (PRIMARY) HYPERTENSION: ICD-10-CM

## 2024-01-31 PROCEDURE — 99495 TRANSJ CARE MGMT MOD F2F 14D: CPT

## 2024-01-31 RX ORDER — METHYLPREDNISOLONE 4 MG/1
4 TABLET ORAL
Refills: 0 | Status: ACTIVE | COMMUNITY
Start: 2024-01-30

## 2024-01-31 RX ORDER — NAPROXEN 375 MG/1
375 TABLET ORAL
Refills: 0 | Status: ACTIVE | COMMUNITY
Start: 2024-01-31

## 2024-01-31 RX ORDER — CEFPODOXIME PROXETIL 200 MG/1
200 TABLET, FILM COATED ORAL
Refills: 0 | Status: DISCONTINUED | COMMUNITY
Start: 2024-01-30 | End: 2024-01-31

## 2024-01-31 RX ORDER — AMOXICILLIN AND CLAVULANATE POTASSIUM 875; 125 MG/1; MG/1
875-125 TABLET, COATED ORAL
Refills: 0 | Status: ACTIVE | COMMUNITY
Start: 2024-01-30

## 2024-01-31 RX ORDER — IPRATROPIUM BROMIDE AND ALBUTEROL SULFATE 2.5; .5 MG/3ML; MG/3ML
0.5-2.5 (3) SOLUTION RESPIRATORY (INHALATION) EVERY 8 HOURS
Refills: 0 | Status: ACTIVE | COMMUNITY
Start: 2024-01-30

## 2024-01-31 NOTE — REVIEW OF SYSTEMS
[Cough] : cough [Negative] : Psychiatric [FreeTextEntry2] : feels tired [FreeTextEntry6] : intermittent sob with cough

## 2024-01-31 NOTE — HISTORY OF PRESENT ILLNESS
[Post-hospitalization from ___ Hospital] : Post-hospitalization from [unfilled] Hospital [Admitted on: ___] : The patient was admitted on [unfilled] [Discharged on ___] : discharged on [unfilled] [Discharge Summary] : discharge summary [Discharge Med List] : discharge medication list [Med Reconciliation] : medication reconciliation has been completed [Patient Contacted By: ____] : and contacted by [unfilled] [FreeTextEntry2] : FROM [place] NOTE PROVIDER: 78M with PMHX TIA, HTN, HLD presents to [facility] ER c/o Cough, SOB, and Fever Tmax 103F which began few days ago admitted for Acute Hypoxic Respiratory Failure 2/2 RLL PNA and HMPV.  PLAN:  Acute Hypoxic Respiratory Failure 2/2 RLL PNA and HMPV - No leukocytosis - Procal negative  - RVP, hMPV  - BCx, no growth 24 hrs - UCx, contaminative  - Sputum culture, pending result  - CT Chest IVC +RLL PNA - O2 via NC PRN SPO2 >92% - Duoneb q6 - Empiric IV ABX with Ceftriaxone/Azithromycin - Solumedrol 40 mg IV q8, will samir steriods  - Pulm consult, recs appreciated  - TTE, EF 63  - isolation precautions -IS  -Robitussin  TIA, HTN, HLD -C/w ASA 81mg,  Plavix 75mg q24, Verapamil ER 100mg q24 -C/w Crestor 20mg  CC: Pt is seen at home s/p recent admission for PNA. Pt is temporarily unable to leave home as it requires a considerable and taxing effort HPI: Pt Is a 77y/o female enrolled in the [facility] seen at home s/p a recent admission for PNA . Pt was contacted by TCM RN on 1/26 and med rec was done within 48 hours of DC.  Upon exam A&O x 3 NAD.  and son present for exam as well as Ernestina care giver. s/p brief hospitalization for HmPV PNA, overall family feels she is 10-20%  better, still with inflammatory cough. Patient states she feels a little better as well but does c/o some intermittent SOB. Cough sounds wet, but brings up little clear sputum. Today is last day medrol, has another couple of days Augmentin. HTN: BP stable on current meds. H/O TIA on ASA, plavix. [facility] services in home. s/p TH visit yesterday for persistent cough, given mucinex dm and tessalon

## 2024-01-31 NOTE — PLAN
[FreeTextEntry1] : A/P: # S/P hospitalization for: - Acute Hypoxic Respiratory Failure 2/2 RLL PNA and HMPV  - CT Chest IVC +RLL PNA -  con't Duoneb q8, complete course augmentin, medrol - s/p empiric IV ABX with Ceftriaxone/Azithromycin - s/p Solumedrol - mucinex, tessalon prn - spoke with nurse Reed at Pul office confirmed appt for 10am tomorrow, unable to squeeze in today. Will call son Martin if any cancellations - call for worsening of sx or report to ED  # TIA, HTN, HLD - con't  ASA 81mg, Plavix, Verapamil ER 100mg q24 - con't statin

## 2024-01-31 NOTE — PHYSICAL EXAM
[No Acute Distress] : no acute distress [Well Nourished] : well nourished [Well Developed] : well developed [Normal Sclera/Conjunctiva] : normal sclera/conjunctiva [Normal Outer Ear/Nose] : the outer ears and nose were normal in appearance [Normal Oropharynx] : the oropharynx was normal [Supple] : supple [No Respiratory Distress] : no respiratory distress  [Clear to Auscultation] : lungs were clear to auscultation bilaterally [No Accessory Muscle Use] : no accessory muscle use [Normal Rate] : normal rate  [Regular Rhythm] : with a regular rhythm [Normal S1, S2] : normal S1 and S2 [Pedal Pulses Present] : the pedal pulses are present [No Edema] : there was no peripheral edema [No Extremity Clubbing/Cyanosis] : no extremity clubbing/cyanosis [Soft] : abdomen soft [Non Tender] : non-tender [Non-distended] : non-distended [Normal Bowel Sounds] : normal bowel sounds [No Spinal Tenderness] : no spinal tenderness [Grossly Normal Strength/Tone] : grossly normal strength/tone [No Rash] : no rash [Normal Gait] : normal gait [Coordination Grossly Intact] : coordination grossly intact [No Focal Deficits] : no focal deficits [Normal Affect] : the affect was normal [Alert and Oriented x3] : oriented to person, place, and time [de-identified] : no thrush [de-identified] : shallow respirations

## 2024-02-01 ENCOUNTER — APPOINTMENT (OUTPATIENT)
Dept: PULMONOLOGY | Facility: CLINIC | Age: 79
End: 2024-02-01
Payer: MEDICARE

## 2024-02-01 VITALS
OXYGEN SATURATION: 97 % | HEART RATE: 110 BPM | DIASTOLIC BLOOD PRESSURE: 72 MMHG | WEIGHT: 158 LBS | HEIGHT: 61 IN | RESPIRATION RATE: 16 BRPM | BODY MASS INDEX: 29.83 KG/M2 | SYSTOLIC BLOOD PRESSURE: 123 MMHG

## 2024-02-01 DIAGNOSIS — J18.9 PNEUMONIA, UNSPECIFIED ORGANISM: ICD-10-CM

## 2024-02-01 PROCEDURE — 99495 TRANSJ CARE MGMT MOD F2F 14D: CPT

## 2024-02-01 RX ORDER — CEFPODOXIME PROXETIL 200 MG/1
TABLET, FILM COATED ORAL
Refills: 0 | Status: ACTIVE | COMMUNITY

## 2024-02-01 RX ORDER — AZELASTINE HYDROCHLORIDE 137 UG/1
0.1 SPRAY, METERED NASAL DAILY
Qty: 1 | Refills: 1 | Status: ACTIVE | COMMUNITY
Start: 2024-02-01 | End: 1900-01-01

## 2024-02-01 NOTE — PHYSICAL EXAM
[Enlarged Base of the Tongue] : enlarged base of the tongue [IV] : Mallampati Class: IV [Normal Appearance] : normal appearance [Supple] : supple [No Neck Mass] : no neck mass [No JVD] : no jvd [Normal Rate/Rhythm] : normal rate/rhythm [Normal S1, S2] : normal s1, s2 [No Murmurs] : no murmurs [No Resp Distress] : no resp distress [No Acc Muscle Use] : no acc muscle use [Clear to Auscultation Bilaterally] : clear to auscultation bilaterally [No Abnormalities] : no abnormalities [Kyphosis] : kyphosis [Scoliosis] : scoliosis [Tenderness: ___] : tenderness: [unfilled] [Benign] : benign [Not Tender] : not tender [Soft] : soft [No Hernias] : no hernias [Normal Gait] : normal gait [No Edema] : no edema [No Rash] : no rash [No Motor Deficits] : no motor deficits [Normal Affect] : normal affect [TextBox_2] : pleasant quit  f no distress no cough no sob    [TextBox_11] : no lesions

## 2024-02-01 NOTE — REASON FOR VISIT
[Follow-Up - From Hospitalization] : a follow-up visit after a recent hospitalization [Pneumonia] : pneumonia [Spouse] : spouse [Family Member] : family member

## 2024-02-01 NOTE — HISTORY OF PRESENT ILLNESS
[Never] : never [TextBox_4] : 2/1/24    Martin   son here  good historian with both parents  Discharge Date	25-Jan-2024 Admission Date	23-Jan-2024 01:35 Reason for Admission	SOB/Cough Hospital Course	 79 y/o M with PMH of TIA, HTN, HLD presented with complaints of cough, fever and shortness of breath admitted for acute hypoxic respiratory failure secondary to right lower lobe pneumonia and human meta pneumovirus infection. Blood cultures were negative. The patient was weaned off supplemental O2.     Med Reconciliation: Medication Reconciliation Status	Admission Reconciliation is Not Complete Discharge Reconciliation is Completed Discharge Medications	aspirin 81 mg oral capsule: 1 cap(s) orally once a day Crestor 20 mg oral tablet: 1 tab(s) orally once a day ipratropium-albuterol 0.5 mg-2.5 mg/3 mL inhalation solution: 3 milliliter(s) by nebulizer every 8 hours Medrol Dosepak 4 mg oral tablet: 1 packet(s) orally once a day Please take as directed naproxen 375 mg (as sodium) oral tablet, extended release: 1 tab(s) orally 2 times a day as needed for  moderate pain Nebulizer: Use nebulizer every 8 hrs as needed Plavix 75 mg oral tablet: 1 tab(s) orally once a day verapamil 100 mg/24 hours oral capsule, extended release: 1 cap(s) orally once a day   , , Care Plan/Procedures: Discharge Diagnoses, Assessment and Plan of Treatment	PRINCIPAL DISCHARGE DIAGNOSIS Diagnosis: Pneumonia due to human metapneumovirus Assessment and Plan of Treatment: - Please complete steroid taper as prescribed - Follow up with your primary care physician in 1 week  79y/o  female   born in [facility]  never smoker    h/o   work  odd     h/o stroke  10 years - some speech issues only     OOH pneumonia - can drive    -eats ok -   sob at times  main complaint   dizziness - no chest pain  -  only complaint is  right sided  pain     radiating to back  - cough  wet -  clear   -+ post nasal drip -   cough    good expectoration no hemoptysis  - environmental   trigger no carpets no pets    dust control =-

## 2024-02-01 NOTE — PROCEDURE
[FreeTextEntry1] : PROCEDURE DATE: 01/22/2024 INTERPRETATION: CLINICAL INFORMATION: Hypoxia, chest pain. COMPARISON: None. CONTRAST/COMPLICATIONS: IV Contrast: Omnipaque 350 70 cc administered 30 cc discarded Oral Contrast: NONE Complications: None reported at time of study completion  PROCEDURE: CT Angiography of the Chest. Sagittal and coronal reformats were performed as well as 3D (MIP) reconstructions.  FINDINGS:  LUNGS AND AIRWAYS: Patent central airways. Right lower lobe posteromedial lung consolidation compatible with pneumonia. Calcified granuloma in the right upper lobe. Mild bibasilar dependent atelectasis. Platelike atelectasis in the lingula. PLEURA: Trace right pleural effusion. MEDIASTINUM AND CINTHIA: Enlarged bilateral hilar and subcarinal lymph nodes; representative left hilar lymph node measuring 2.1 x 1.1 cm, representative right hilar lymph node measuring 1.6 x 1.8 cm and representative subcarinal lymph node measuring 1.8 x 1.4 cm.. Prominent posterior paraesophageal lymph nodes. VESSELS: Adequate pulmonary arterial opacification. No pulmonary embolism. Main pulmonary artery is not dilated. Thoracic aorta is normal in caliber. Mild atherosclerotic calcification of the thoracic aorta and coronary arteries. HEART: Heart is mildly enlarged. No pericardial effusion. CHEST WALL AND LOWER NECK: Loop recorder device in the left anterior chest wall. VISUALIZED UPPER ABDOMEN: Small hiatus hernia. Small left renal cyst. BONES: Degenerative changes of the spine. Sclerotic focus in the T3 vertebral body most likely representing a bone island.  IMPRESSION: No pulmonary embolism.  Right lower lobe posteromedial lung consolidation compatible with pneumonia. Trace right pleural effusion.  Mild bilateral hilar and mediastinal lymphadenopathy.    --- End of Report ---      PATRICIA MORALES DO; Attending Radiologist This document has been electronically signed. Jan 22 2024 11:17PM  no

## 2024-02-01 NOTE — ASSESSMENT
[FreeTextEntry1] : 77y/o female 1- OOH 12/24  RLL  pneumonia  improved  wtih some right sided pleuritic pain  ? muscular 2- post nasal drip 3- h/o  stroke no residuals 4- obesity crowded airway   5- vaccinations per primary  recommendations 1- add azelastine to    flonase  nasal sprays 2- repeat cxr   3- d dimer  4- f/u ct few weeks if indicated 5- add claritin 6- has primary MD follow up  return in   4 weeks   will  review above and discuss with son  Martin again - chart reviewed imaging and  above in detail  - understood and  agreement on plan

## 2024-02-01 NOTE — REVIEW OF SYSTEMS
[Poor Appetite] : poor appetite [Cough] : cough [Obesity] : obesity [Fever] : no fever [Recent Wt Gain (___ Lbs)] : ~T no recent weight gain [Chills] : no chills [Recent Wt Loss (___ Lbs)] : ~T no recent weight loss [Sore Throat] : no sore throat [Dry Mouth] : no dry mouth [Sinus Problems] : no sinus problems [Hemoptysis] : no hemoptysis [Sputum] : no sputum [Dyspnea] : no dyspnea [Wheezing] : no wheezing [SOB on Exertion] : no sob on exertion [Chest Discomfort] : no chest discomfort [Palpitations] : no palpitations [GERD] : no gerd [Abdominal Pain] : no abdominal pain [Nausea] : no nausea [Vomiting] : no vomiting [Dysphagia] : no dysphagia [Bleeding] : no bleeding [Chronic Pain] : no chronic pain [Rash] : no rash [Blood Transfusion] : no blood transfusion [Clotting Disorder/ Frequent bleeding] : no clotting disorder/ frequent bleeding [Diabetes] : no diabetes [Thyroid Problem] : no thyroid problem

## 2024-02-07 LAB — BACTERIA SPT CULT: NORMAL

## 2024-02-12 ENCOUNTER — NON-APPOINTMENT (OUTPATIENT)
Age: 79
End: 2024-02-12

## 2024-02-15 ENCOUNTER — NON-APPOINTMENT (OUTPATIENT)
Age: 79
End: 2024-02-15

## 2024-02-15 ENCOUNTER — TRANSCRIPTION ENCOUNTER (OUTPATIENT)
Age: 79
End: 2024-02-15

## 2024-02-22 ENCOUNTER — TRANSCRIPTION ENCOUNTER (OUTPATIENT)
Age: 79
End: 2024-02-22

## 2024-02-22 ENCOUNTER — APPOINTMENT (OUTPATIENT)
Dept: PULMONOLOGY | Facility: CLINIC | Age: 79
End: 2024-02-22

## 2024-03-11 ENCOUNTER — APPOINTMENT (OUTPATIENT)
Dept: PULMONOLOGY | Facility: CLINIC | Age: 79
End: 2024-03-11
Payer: MEDICARE

## 2024-03-11 VITALS
DIASTOLIC BLOOD PRESSURE: 62 MMHG | HEART RATE: 89 BPM | OXYGEN SATURATION: 98 % | HEIGHT: 61 IN | RESPIRATION RATE: 16 BRPM | SYSTOLIC BLOOD PRESSURE: 113 MMHG | BODY MASS INDEX: 31.15 KG/M2 | WEIGHT: 165 LBS

## 2024-03-11 DIAGNOSIS — J45.20 MILD INTERMITTENT ASTHMA, UNCOMPLICATED: ICD-10-CM

## 2024-03-11 DIAGNOSIS — R93.89 ABNORMAL FINDINGS ON DIAGNOSTIC IMAGING OF OTHER SPECIFIED BODY STRUCTURES: ICD-10-CM

## 2024-03-11 DIAGNOSIS — J30.2 OTHER SEASONAL ALLERGIC RHINITIS: ICD-10-CM

## 2024-03-11 PROCEDURE — G2211 COMPLEX E/M VISIT ADD ON: CPT

## 2024-03-11 PROCEDURE — 99213 OFFICE O/P EST LOW 20 MIN: CPT

## 2024-03-11 NOTE — PHYSICAL EXAM
[No Deformities] : no deformities [No Acute Distress] : no acute distress [Normal Oropharynx] : normal oropharynx [Enlarged Base of the Tongue] : enlarged base of the tongue [IV] : Mallampati Class: IV [Normal Appearance] : normal appearance [Supple] : supple [No JVD] : no jvd [Normal Rate/Rhythm] : normal rate/rhythm [No Murmurs] : no murmurs [Normal S1, S2] : normal s1, s2 [No Resp Distress] : no resp distress [No Acc Muscle Use] : no acc muscle use [Benign] : benign [Clear to Auscultation Bilaterally] : clear to auscultation bilaterally [Normal Gait] : normal gait [Soft] : soft [No Clubbing] : no clubbing [No Rash] : no rash [No Edema] : no edema [Normal Affect] : normal affect [No Motor Deficits] : no motor deficits [TextBox_2] : pleasant f no distress no cough  speaking full sentences  [TextBox_11] : crowded no lesion

## 2024-03-11 NOTE — HISTORY OF PRESENT ILLNESS
[Never] : never [TextBox_4] : 2/1/24    Martin   son here  good historian with both parents  Discharge Date	25-Jan-2024 Admission Date	23-Jan-2024 01:35 Reason for Admission	SOB/Cough Hospital Course	 79 y/o M with PMH of TIA, HTN, HLD presented with complaints of cough, fever and shortness of breath admitted for acute hypoxic respiratory failure secondary to right lower lobe pneumonia and human meta pneumovirus infection. Blood cultures were negative. The patient was weaned off supplemental O2.     Med Reconciliation: Medication Reconciliation Status	Admission Reconciliation is Not Complete Discharge Reconciliation is Completed Discharge Medications	aspirin 81 mg oral capsule: 1 cap(s) orally once a day Crestor 20 mg oral tablet: 1 tab(s) orally once a day ipratropium-albuterol 0.5 mg-2.5 mg/3 mL inhalation solution: 3 milliliter(s) by nebulizer every 8 hours Medrol Dosepak 4 mg oral tablet: 1 packet(s) orally once a day Please take as directed naproxen 375 mg (as sodium) oral tablet, extended release: 1 tab(s) orally 2 times a day as needed for  moderate pain Nebulizer: Use nebulizer every 8 hrs as needed Plavix 75 mg oral tablet: 1 tab(s) orally once a day verapamil 100 mg/24 hours oral capsule, extended release: 1 cap(s) orally once a day   , , Care Plan/Procedures: Discharge Diagnoses, Assessment and Plan of Treatment	PRINCIPAL DISCHARGE DIAGNOSIS Diagnosis: Pneumonia due to human metapneumovirus Assessment and Plan of Treatment: - Please complete steroid taper as prescribed - Follow up with your primary care physician in 1 week  77y/o  female   born in [facility]  never smoker    h/o   work  odd     h/o stroke  10 years - some speech issues only     OOH pneumonia - can drive    -eats ok -   sob at times  main complaint   dizziness - no chest pain  -  only complaint is  right sided  pain     radiating to back  - cough  wet -  clear   -+ post nasal drip -   cough    good expectoration no hemoptysis  - environmental   trigger no carpets no pets    dust control =-  3/11/2024  Martin son here 77y/o  female never smoker    h/o pneumonia   stroke hx     here for cough  UACS/ ? asthma - doign well   overall -  has nasal spray   - duonebs  prn  - some  post nasal  drip  -

## 2024-03-11 NOTE — PROCEDURE
[FreeTextEntry1] : PROCEDURE DATE: 01/22/2024 INTERPRETATION: CLINICAL INFORMATION: Hypoxia, chest pain. COMPARISON: None. CONTRAST/COMPLICATIONS: IV Contrast: Omnipaque 350 70 cc administered 30 cc discarded Oral Contrast: NONE Complications: None reported at time of study completion  PROCEDURE: CT Angiography of the Chest. Sagittal and coronal reformats were performed as well as 3D (MIP) reconstructions.  FINDINGS:  LUNGS AND AIRWAYS: Patent central airways. Right lower lobe posteromedial lung consolidation compatible with pneumonia. Calcified granuloma in the right upper lobe. Mild bibasilar dependent atelectasis. Platelike atelectasis in the lingula. PLEURA: Trace right pleural effusion. MEDIASTINUM AND CINTHIA: Enlarged bilateral hilar and subcarinal lymph nodes; representative left hilar lymph node measuring 2.1 x 1.1 cm, representative right hilar lymph node measuring 1.6 x 1.8 cm and representative subcarinal lymph node measuring 1.8 x 1.4 cm.. Prominent posterior paraesophageal lymph nodes. VESSELS: Adequate pulmonary arterial opacification. No pulmonary embolism. Main pulmonary artery is not dilated. Thoracic aorta is normal in caliber. Mild atherosclerotic calcification of the thoracic aorta and coronary arteries. HEART: Heart is mildly enlarged. No pericardial effusion. CHEST WALL AND LOWER NECK: Loop recorder device in the left anterior chest wall. VISUALIZED UPPER ABDOMEN: Small hiatus hernia. Small left renal cyst. BONES: Degenerative changes of the spine. Sclerotic focus in the T3 vertebral body most likely representing a bone island.  IMPRESSION: No pulmonary embolism.  Right lower lobe posteromedial lung consolidation compatible with pneumonia. Trace right pleural effusion.  Mild bilateral hilar and mediastinal lymphadenopathy.    --- End of Report ---      PATRICIA MORALES DO; Attending Radiologist This document has been electronically signed. Jan 22 2024 11:17PM

## 2024-03-11 NOTE — ASSESSMENT
[FreeTextEntry1] : 79y/o female 1-  ct 1/2024    adenopathy with pneumonia  now improved  2- post nasal drip/ reactive airways  3- h/o  stroke no residuals 4- obesity crowded airway   5- vaccinations per primary  recommendations 1-  azelastine with     flonase  nasal sprays 2- f/u ct chest  3- duonebs  q  8 hour prn  4- allergy testing   5- add claritin 6- has primary MD follow up  -   discussion and   review  medications and   review ddx      f/u  ct  ordered

## 2024-03-11 NOTE — REVIEW OF SYSTEMS
[Poor Appetite] : poor appetite [Cough] : cough [Obesity] : obesity [Fever] : no fever [Recent Wt Gain (___ Lbs)] : ~T no recent weight gain [Chills] : no chills [Sore Throat] : no sore throat [Recent Wt Loss (___ Lbs)] : ~T no recent weight loss [Dry Mouth] : no dry mouth [Sinus Problems] : no sinus problems [Hemoptysis] : no hemoptysis [Sputum] : no sputum [Dyspnea] : no dyspnea [SOB on Exertion] : no sob on exertion [Wheezing] : no wheezing [Chest Discomfort] : no chest discomfort [GERD] : no gerd [Palpitations] : no palpitations [Abdominal Pain] : no abdominal pain [Nausea] : no nausea [Vomiting] : no vomiting [Dysphagia] : no dysphagia [Bleeding] : no bleeding [Rash] : no rash [Chronic Pain] : no chronic pain [Clotting Disorder/ Frequent bleeding] : no clotting disorder/ frequent bleeding [Blood Transfusion] : no blood transfusion [Diabetes] : no diabetes [Thyroid Problem] : no thyroid problem

## 2024-03-14 ENCOUNTER — APPOINTMENT (OUTPATIENT)
Dept: GASTROENTEROLOGY | Facility: CLINIC | Age: 79
End: 2024-03-14
Payer: MEDICARE

## 2024-03-14 VITALS
OXYGEN SATURATION: 97 % | HEIGHT: 61 IN | SYSTOLIC BLOOD PRESSURE: 112 MMHG | RESPIRATION RATE: 16 BRPM | WEIGHT: 165 LBS | DIASTOLIC BLOOD PRESSURE: 76 MMHG | HEART RATE: 80 BPM | BODY MASS INDEX: 31.15 KG/M2

## 2024-03-14 DIAGNOSIS — R10.13 EPIGASTRIC PAIN: ICD-10-CM

## 2024-03-14 DIAGNOSIS — R13.10 DYSPHAGIA, UNSPECIFIED: ICD-10-CM

## 2024-03-14 PROCEDURE — 99204 OFFICE O/P NEW MOD 45 MIN: CPT

## 2024-03-14 NOTE — PLAN
[TextEntry] : Will obtain esophagram. Will start pantoprazole 40 mg p.o. daily 30 minutes before breakfast. If not responsive to acid suppression therapy trial we will consider upper endoscopy evaluation. Further dysphagia workup to be determined based on above. May consider speech pathology evaluation given concerns for potential oropharyngeal dysphagia. Follow-up in office in 6 to 8 weeks. -

## 2024-03-14 NOTE — PHYSICAL EXAM
[Alert] : alert [Healthy Appearing] : healthy appearing [Normal Voice/Communication] : normal voice/communication [No Acute Distress] : no acute distress [Sclera] : the sclera and conjunctiva were normal [Normal Lips/Gums] : the lips and gums were normal [Hearing Threshold Finger Rub Not [place]] : hearing was normal [Oropharynx] : the oropharynx was normal [Normal Appearance] : the appearance of the neck was normal [No Neck Mass] : no neck mass was observed [No Respiratory Distress] : no respiratory distress [No Acc Muscle Use] : no accessory muscle use [Respiration, Rhythm And Depth] : normal respiratory rhythm and effort [Heart Rate And Rhythm] : heart rate was normal and rhythm regular [Normal S1, S2] : normal S1 and S2 [Murmurs] : no murmurs [None] : no edema [Abdomen Tenderness] : non-tender [Abdomen Soft] : soft [No Masses] : no abdominal mass palpated [] : no hepatosplenomegaly [Oriented To Time, Place, And Person] : oriented to person, place, and time

## 2024-03-14 NOTE — ASSESSMENT
[FreeTextEntry1] : Ms. Duron is a 78 year old woman who was referred for evaluation of progressive dysphagia.  Symptoms not entirely consistent with oropharyngeal or esophageal dysphagia based on history alone.  Patient and family also apprehensive regarding invasive evaluation given age and comorbidities.  Therefore, will plan for esophagram to evaluate for potential esophageal causes of symptoms.  Will also consider speech pathology evaluation versus modified barium swallow versus manometry/Endoflip with upper endoscopy if significant aphasia symptoms persist.  Given recurrent dyspepsia/GERD symptoms we will also start acid suppression therapy trial in the interim.  Patient due for age-appropriate colon cancer screening however would defer evaluation at this time per her preference.  Will follow-up in office after above interventions to discuss if symptoms improved/resolved in approximately 6 to 8 weeks.

## 2024-04-09 ENCOUNTER — TRANSCRIPTION ENCOUNTER (OUTPATIENT)
Age: 79
End: 2024-04-09

## 2024-05-14 ENCOUNTER — RX RENEWAL (OUTPATIENT)
Age: 79
End: 2024-05-14

## 2024-05-14 RX ORDER — PANTOPRAZOLE 40 MG/1
40 TABLET, DELAYED RELEASE ORAL
Qty: 60 | Refills: 0 | Status: ACTIVE | COMMUNITY
Start: 2024-03-14 | End: 1900-01-01

## 2024-05-17 ENCOUNTER — APPOINTMENT (OUTPATIENT)
Dept: GASTROENTEROLOGY | Facility: CLINIC | Age: 79
End: 2024-05-17

## 2024-07-08 ENCOUNTER — APPOINTMENT (OUTPATIENT)
Dept: PULMONOLOGY | Facility: CLINIC | Age: 79
End: 2024-07-08

## 2024-07-08 ENCOUNTER — RX RENEWAL (OUTPATIENT)
Age: 79
End: 2024-07-08

## 2024-07-09 NOTE — ED PROVIDER NOTE - OBJECTIVE STATEMENT
Detail Level: Zone 79 yo with pmh htn, hld, tia 12 yrs ago presents for fever of 103 deg F, sob, and generalized malaise. Symptoms began last Thursday with shortness of breath with productive yellow cough. Pt denies sick contacts, did not get flu vaccine. Pt denies dysuria, confusion, diarrhea.     Pt presented to pcp office, and found to have a 103 degree fever. Sent to the ED for further workup.

## 2024-08-13 ENCOUNTER — RX RENEWAL (OUTPATIENT)
Age: 79
End: 2024-08-13

## 2024-11-04 ENCOUNTER — RX RENEWAL (OUTPATIENT)
Age: 79
End: 2024-11-04

## 2024-12-30 ENCOUNTER — RX RENEWAL (OUTPATIENT)
Age: 79
End: 2024-12-30

## 2025-02-28 ENCOUNTER — RX RENEWAL (OUTPATIENT)
Age: 80
End: 2025-02-28

## 2025-04-17 ENCOUNTER — APPOINTMENT (OUTPATIENT)
Dept: GASTROENTEROLOGY | Facility: CLINIC | Age: 80
End: 2025-04-17
Payer: MEDICARE

## 2025-04-17 VITALS
HEIGHT: 61 IN | HEART RATE: 100 BPM | RESPIRATION RATE: 14 BRPM | TEMPERATURE: 97.2 F | OXYGEN SATURATION: 98 % | WEIGHT: 171 LBS | DIASTOLIC BLOOD PRESSURE: 72 MMHG | BODY MASS INDEX: 32.28 KG/M2 | SYSTOLIC BLOOD PRESSURE: 117 MMHG

## 2025-04-17 DIAGNOSIS — J18.9 PNEUMONIA, UNSPECIFIED ORGANISM: ICD-10-CM

## 2025-04-17 DIAGNOSIS — E78.00 PURE HYPERCHOLESTEROLEMIA, UNSPECIFIED: ICD-10-CM

## 2025-04-17 DIAGNOSIS — I10 ESSENTIAL (PRIMARY) HYPERTENSION: ICD-10-CM

## 2025-04-17 DIAGNOSIS — B35.1 TINEA UNGUIUM: ICD-10-CM

## 2025-04-17 DIAGNOSIS — R10.31 RIGHT LOWER QUADRANT PAIN: ICD-10-CM

## 2025-04-17 DIAGNOSIS — R10.13 EPIGASTRIC PAIN: ICD-10-CM

## 2025-04-17 DIAGNOSIS — J30.2 OTHER SEASONAL ALLERGIC RHINITIS: ICD-10-CM

## 2025-04-17 DIAGNOSIS — R13.10 DYSPHAGIA, UNSPECIFIED: ICD-10-CM

## 2025-04-17 DIAGNOSIS — J45.20 MILD INTERMITTENT ASTHMA, UNCOMPLICATED: ICD-10-CM

## 2025-04-17 DIAGNOSIS — J12.3 HUMAN METAPNEUMOVIRUS PNEUMONIA: ICD-10-CM

## 2025-04-17 PROCEDURE — 99214 OFFICE O/P EST MOD 30 MIN: CPT

## 2025-04-17 RX ORDER — FAMOTIDINE 20 MG/1
20 TABLET, FILM COATED ORAL
Refills: 0 | Status: ACTIVE | COMMUNITY

## 2025-04-17 RX ORDER — MEMANTINE 5 MG/1
5 TABLET ORAL
Refills: 0 | Status: ACTIVE | COMMUNITY

## 2025-04-17 RX ORDER — POLYETHYLENE GLYCOL 3350, SODIUM SULFATE, POTASSIUM CHLORIDE, MAGNESIUM SULFATE, AND SODIUM CHLORIDE FOR ORAL SOLUTION 178.7-7.3G
178.7 KIT ORAL
Qty: 1 | Refills: 0 | Status: ACTIVE | COMMUNITY
Start: 2025-04-17 | End: 1900-01-01

## 2025-04-21 LAB
HCT VFR BLD CALC: 40.8 %
HGB BLD-MCNC: 12.6 G/DL
MCHC RBC-ENTMCNC: 26.5 PG
MCHC RBC-ENTMCNC: 30.9 G/DL
MCV RBC AUTO: 85.7 FL
PLATELET # BLD AUTO: 308 K/UL
RBC # BLD: 4.76 M/UL
RBC # FLD: 15.6 %
WBC # FLD AUTO: 5.63 K/UL

## 2025-04-28 ENCOUNTER — RX RENEWAL (OUTPATIENT)
Age: 80
End: 2025-04-28

## 2025-05-06 ENCOUNTER — NON-APPOINTMENT (OUTPATIENT)
Age: 80
End: 2025-05-06

## 2025-05-08 ENCOUNTER — APPOINTMENT (OUTPATIENT)
Dept: GASTROENTEROLOGY | Facility: GI CENTER | Age: 80
End: 2025-05-08

## 2025-05-21 ENCOUNTER — RX RENEWAL (OUTPATIENT)
Age: 80
End: 2025-05-21

## 2025-07-10 ENCOUNTER — APPOINTMENT (OUTPATIENT)
Dept: GASTROENTEROLOGY | Facility: GI CENTER | Age: 80
End: 2025-07-10

## 2025-09-13 ENCOUNTER — EMERGENCY (EMERGENCY)
Facility: HOSPITAL | Age: 80
LOS: 1 days | End: 2025-09-13
Attending: EMERGENCY MEDICINE
Payer: MEDICARE

## 2025-09-13 VITALS
WEIGHT: 179.9 LBS | RESPIRATION RATE: 16 BRPM | OXYGEN SATURATION: 96 % | SYSTOLIC BLOOD PRESSURE: 132 MMHG | DIASTOLIC BLOOD PRESSURE: 86 MMHG | HEART RATE: 89 BPM | TEMPERATURE: 98 F

## 2025-09-13 VITALS
SYSTOLIC BLOOD PRESSURE: 128 MMHG | HEART RATE: 84 BPM | DIASTOLIC BLOOD PRESSURE: 78 MMHG | OXYGEN SATURATION: 98 % | TEMPERATURE: 98 F | RESPIRATION RATE: 16 BRPM

## 2025-09-13 PROCEDURE — 99285 EMERGENCY DEPT VISIT HI MDM: CPT

## 2025-09-13 PROCEDURE — 71250 CT THORAX DX C-: CPT

## 2025-09-13 PROCEDURE — 73590 X-RAY EXAM OF LOWER LEG: CPT | Mod: 26,LT

## 2025-09-13 PROCEDURE — 99284 EMERGENCY DEPT VISIT MOD MDM: CPT | Mod: 25

## 2025-09-13 PROCEDURE — 73552 X-RAY EXAM OF FEMUR 2/>: CPT | Mod: 26,LT

## 2025-09-13 PROCEDURE — 73564 X-RAY EXAM KNEE 4 OR MORE: CPT | Mod: 26,LT

## 2025-09-13 PROCEDURE — 72131 CT LUMBAR SPINE W/O DYE: CPT | Mod: 26

## 2025-09-13 PROCEDURE — 74176 CT ABD & PELVIS W/O CONTRAST: CPT

## 2025-09-13 PROCEDURE — 72128 CT CHEST SPINE W/O DYE: CPT | Mod: 26

## 2025-09-13 PROCEDURE — 70450 CT HEAD/BRAIN W/O DYE: CPT

## 2025-09-13 PROCEDURE — 73552 X-RAY EXAM OF FEMUR 2/>: CPT

## 2025-09-13 PROCEDURE — 72125 CT NECK SPINE W/O DYE: CPT | Mod: 26

## 2025-09-13 PROCEDURE — 73590 X-RAY EXAM OF LOWER LEG: CPT

## 2025-09-13 PROCEDURE — 71250 CT THORAX DX C-: CPT | Mod: 26

## 2025-09-13 PROCEDURE — 70450 CT HEAD/BRAIN W/O DYE: CPT | Mod: 26

## 2025-09-13 PROCEDURE — 74176 CT ABD & PELVIS W/O CONTRAST: CPT | Mod: 26

## 2025-09-13 PROCEDURE — 72125 CT NECK SPINE W/O DYE: CPT

## 2025-09-13 PROCEDURE — 73564 X-RAY EXAM KNEE 4 OR MORE: CPT

## 2025-09-13 RX ORDER — ACETAMINOPHEN 500 MG/5ML
650 LIQUID (ML) ORAL ONCE
Refills: 0 | Status: COMPLETED | OUTPATIENT
Start: 2025-09-13 | End: 2025-09-13

## 2025-09-13 RX ORDER — METHOCARBAMOL 500 MG/1
1500 TABLET, FILM COATED ORAL ONCE
Refills: 0 | Status: COMPLETED | OUTPATIENT
Start: 2025-09-13 | End: 2025-09-13

## 2025-09-13 RX ORDER — ACETAMINOPHEN 500 MG/5ML
1000 LIQUID (ML) ORAL ONCE
Refills: 0 | Status: DISCONTINUED | OUTPATIENT
Start: 2025-09-13 | End: 2025-09-13

## 2025-09-13 RX ADMIN — Medication 650 MILLIGRAM(S): at 14:17

## 2025-09-13 RX ADMIN — METHOCARBAMOL 1500 MILLIGRAM(S): 500 TABLET, FILM COATED ORAL at 14:17
